# Patient Record
Sex: MALE | Race: ASIAN | Employment: UNEMPLOYED | URBAN - METROPOLITAN AREA
[De-identification: names, ages, dates, MRNs, and addresses within clinical notes are randomized per-mention and may not be internally consistent; named-entity substitution may affect disease eponyms.]

---

## 2018-09-18 ENCOUNTER — HOSPITAL ENCOUNTER (EMERGENCY)
Age: 43
Discharge: HOME | End: 2018-09-18
Attending: EMERGENCY MEDICINE | Admitting: EMERGENCY MEDICINE
Payer: MEDICARE

## 2018-09-18 ENCOUNTER — APPOINTMENT (OUTPATIENT)
Dept: CT IMAGING | Age: 43
End: 2018-09-18
Attending: FAMILY MEDICINE
Payer: MEDICARE

## 2018-09-18 VITALS
TEMPERATURE: 96.9 F | SYSTOLIC BLOOD PRESSURE: 127 MMHG | BODY MASS INDEX: 25.06 KG/M2 | OXYGEN SATURATION: 98 % | HEART RATE: 72 BPM | HEIGHT: 72 IN | DIASTOLIC BLOOD PRESSURE: 76 MMHG | RESPIRATION RATE: 14 BRPM | WEIGHT: 185 LBS

## 2018-09-18 DIAGNOSIS — E04.1 THYROID NODULE: ICD-10-CM

## 2018-09-18 DIAGNOSIS — H81.11 BPPV (BENIGN PAROXYSMAL POSITIONAL VERTIGO), RIGHT: Primary | ICD-10-CM

## 2018-09-18 LAB
ALBUMIN SERPL-MCNC: 3.5 G/DL (ref 3.5–5)
ALBUMIN/GLOB SERPL: 0.9 {RATIO} (ref 1.1–2.2)
ALP SERPL-CCNC: 76 U/L (ref 45–117)
ALT SERPL-CCNC: 16 U/L (ref 12–78)
ANION GAP SERPL CALC-SCNC: 6 MMOL/L (ref 5–15)
AST SERPL-CCNC: 10 U/L (ref 15–37)
ATRIAL RATE: 72 BPM
BASOPHILS # BLD: 0.1 K/UL (ref 0–0.1)
BASOPHILS NFR BLD: 1 % (ref 0–1)
BILIRUB SERPL-MCNC: 1.2 MG/DL (ref 0.2–1)
BUN SERPL-MCNC: 14 MG/DL (ref 6–20)
BUN/CREAT SERPL: 17 (ref 12–20)
CALCIUM SERPL-MCNC: 8.7 MG/DL (ref 8.5–10.1)
CALCULATED P AXIS, ECG09: 75 DEGREES
CALCULATED R AXIS, ECG10: 31 DEGREES
CALCULATED T AXIS, ECG11: 41 DEGREES
CHLORIDE SERPL-SCNC: 103 MMOL/L (ref 97–108)
CO2 SERPL-SCNC: 29 MMOL/L (ref 21–32)
COMMENT, HOLDF: NORMAL
CREAT SERPL-MCNC: 0.84 MG/DL (ref 0.7–1.3)
DIAGNOSIS, 93000: NORMAL
DIFFERENTIAL METHOD BLD: ABNORMAL
EOSINOPHIL # BLD: 0.1 K/UL (ref 0–0.4)
EOSINOPHIL NFR BLD: 1 % (ref 0–7)
ERYTHROCYTE [DISTWIDTH] IN BLOOD BY AUTOMATED COUNT: 18 % (ref 11.5–14.5)
GLOBULIN SER CALC-MCNC: 4 G/DL (ref 2–4)
GLUCOSE SERPL-MCNC: 278 MG/DL (ref 65–100)
HCT VFR BLD AUTO: 39.3 % (ref 36.6–50.3)
HGB BLD-MCNC: 11.9 G/DL (ref 12.1–17)
IMM GRANULOCYTES # BLD: 0 K/UL (ref 0–0.04)
IMM GRANULOCYTES NFR BLD AUTO: 0 % (ref 0–0.5)
LIPASE SERPL-CCNC: 110 U/L (ref 73–393)
LYMPHOCYTES # BLD: 2.7 K/UL (ref 0.8–3.5)
LYMPHOCYTES NFR BLD: 33 % (ref 12–49)
MCH RBC QN AUTO: 19.3 PG (ref 26–34)
MCHC RBC AUTO-ENTMCNC: 30.3 G/DL (ref 30–36.5)
MCV RBC AUTO: 63.6 FL (ref 80–99)
MONOCYTES # BLD: 0.5 K/UL (ref 0–1)
MONOCYTES NFR BLD: 6 % (ref 5–13)
NEUTS SEG # BLD: 4.8 K/UL (ref 1.8–8)
NEUTS SEG NFR BLD: 59 % (ref 32–75)
NRBC # BLD: 0 K/UL (ref 0–0.01)
NRBC BLD-RTO: 0 PER 100 WBC
P-R INTERVAL, ECG05: 148 MS
PLATELET # BLD AUTO: 362 K/UL (ref 150–400)
PMV BLD AUTO: 9.6 FL (ref 8.9–12.9)
POTASSIUM SERPL-SCNC: 3.7 MMOL/L (ref 3.5–5.1)
PROT SERPL-MCNC: 7.5 G/DL (ref 6.4–8.2)
Q-T INTERVAL, ECG07: 404 MS
QRS DURATION, ECG06: 88 MS
QTC CALCULATION (BEZET), ECG08: 442 MS
RBC # BLD AUTO: 6.18 M/UL (ref 4.1–5.7)
RBC MORPH BLD: ABNORMAL
SAMPLES BEING HELD,HOLD: NORMAL
SODIUM SERPL-SCNC: 138 MMOL/L (ref 136–145)
VENTRICULAR RATE, ECG03: 72 BPM
WBC # BLD AUTO: 8.2 K/UL (ref 4.1–11.1)

## 2018-09-18 PROCEDURE — 85025 COMPLETE CBC W/AUTO DIFF WBC: CPT | Performed by: EMERGENCY MEDICINE

## 2018-09-18 PROCEDURE — 74011250636 HC RX REV CODE- 250/636: Performed by: EMERGENCY MEDICINE

## 2018-09-18 PROCEDURE — 36415 COLL VENOUS BLD VENIPUNCTURE: CPT | Performed by: EMERGENCY MEDICINE

## 2018-09-18 PROCEDURE — 74011636320 HC RX REV CODE- 636/320: Performed by: RADIOLOGY

## 2018-09-18 PROCEDURE — 93005 ELECTROCARDIOGRAM TRACING: CPT

## 2018-09-18 PROCEDURE — 96361 HYDRATE IV INFUSION ADD-ON: CPT

## 2018-09-18 PROCEDURE — 99284 EMERGENCY DEPT VISIT MOD MDM: CPT

## 2018-09-18 PROCEDURE — 80053 COMPREHEN METABOLIC PANEL: CPT | Performed by: EMERGENCY MEDICINE

## 2018-09-18 PROCEDURE — 83690 ASSAY OF LIPASE: CPT | Performed by: EMERGENCY MEDICINE

## 2018-09-18 PROCEDURE — 70496 CT ANGIOGRAPHY HEAD: CPT

## 2018-09-18 PROCEDURE — 96374 THER/PROPH/DIAG INJ IV PUSH: CPT

## 2018-09-18 RX ORDER — MECLIZINE HYDROCHLORIDE 25 MG/1
25 TABLET ORAL
Qty: 30 TAB | Refills: 0 | Status: SHIPPED | OUTPATIENT
Start: 2018-09-18 | End: 2018-09-28

## 2018-09-18 RX ORDER — ONDANSETRON 2 MG/ML
4 INJECTION INTRAMUSCULAR; INTRAVENOUS
Status: COMPLETED | OUTPATIENT
Start: 2018-09-18 | End: 2018-09-18

## 2018-09-18 RX ORDER — MECLIZINE HYDROCHLORIDE 25 MG/1
25 TABLET ORAL
Status: COMPLETED | OUTPATIENT
Start: 2018-09-18 | End: 2018-09-18

## 2018-09-18 RX ORDER — ONDANSETRON 4 MG/1
4 TABLET, ORALLY DISINTEGRATING ORAL
Qty: 30 TAB | Refills: 0 | Status: SHIPPED | OUTPATIENT
Start: 2018-09-18 | End: 2021-11-11

## 2018-09-18 RX ADMIN — SODIUM CHLORIDE 1000 ML: 900 INJECTION, SOLUTION INTRAVENOUS at 12:26

## 2018-09-18 RX ADMIN — IOPAMIDOL 100 ML: 755 INJECTION, SOLUTION INTRAVENOUS at 13:59

## 2018-09-18 RX ADMIN — MECLIZINE HYDROCHLORIDE 25 MG: 25 TABLET ORAL at 12:25

## 2018-09-18 RX ADMIN — ONDANSETRON 4 MG: 2 INJECTION, SOLUTION INTRAMUSCULAR; INTRAVENOUS at 12:25

## 2018-09-18 NOTE — ED PROVIDER NOTES
HPI Comments: 11:25 AM 
I have evaluated the patient as the Provider in Triage. I have reviewed His vital signs and the triage nurse assessment. I have talked with the patient and any available family and advised that I am the provider in triage and have ordered the appropriate study to initiate their work up based on the clinical presentation during my assessment. I have advised that the patient will be accommodated in the Main ED as soon as possible. I have also requested to contact the triage nurse or myself immediately if the patient experiences any changes in their condition during this brief waiting period. Yuly Friedman MD 
 
 
Room-spinning sensation; sweaty; N, V; began today 0100 when he awoke to urinate; went back to sleep; sx's again this morning upon awakening. No hx same. Worse with position change. Yuly Friedman MD 
 
Woke this morning around 1:00 am with sensation of the world spinning, with head movements, particularly when turning head to the right side. On waking later in the morning developed similar symptoms. States that dizziness improves with closing eyes and keeping head still. Denies history of dizziness. Notes mild frontal headache, normal for patient, took tylenol in the morning, no headache currently. Noting nausea and vomiting food, Non-bilious and non-bloody. Deneis chest pain, SOB. Denies history of CVA or MI. Mamie Zhong MD  
 
The history is provided by the patient. No current facility-administered medications on file prior to encounter. Current Outpatient Prescriptions on File Prior to Encounter Medication Sig Dispense Refill  lisinopril (PRINIVIL, ZESTRIL) 5 mg tablet TAKE ONE TABLET BY MOUTH ONCE DAILY 90 Tab 0  
 glucose blood VI test strips (TRUETRACK TEST) strip Check blood sugars 4 times/day( fasting 2 hours after meals) 100 strip 11  
 glucose blood VI test strips (TRUETRACK TEST) strip Please check sugars fasting and 2 hours after meals. 1 Package 3  
 glipiZIDE (GLUCOTROL) 5 mg tablet Take 1 tablet by mouth two (2) times a day. 90 tablet 3  
 metFORMIN (GLUCOPHAGE) 1,000 mg tablet Take 1 tablet by mouth two (2) times daily (with meals). 180 tablet 1 Past Medical History:  
Diagnosis Date  Diabetes ([address] No past surgical history on file. Family History:  
Problem Relation Age of Onset  Diabetes Father  Hypertension Father  Heart Disease Father Social History Social History  Marital status:  Spouse name: N/A  
 Number of children: N/A  
 Years of education: N/A Occupational History  Not on file. Social History Main Topics  Smoking status: Current Every Day Smoker Packs/day: 0.50 Years: 14.00 Types: Cigarettes  Smokeless tobacco: Never Used  Alcohol use 0.0 oz/week  
  0 Glasses of wine, 0 Cans of beer per week Comment: Rarely.  Drug use: No  
 Sexual activity: Yes  
  Partners: Female Other Topics Concern  Not on file Social History Narrative  No narrative on file ALLERGIES: Review of patient's allergies indicates no known allergies. Review of Systems Constitutional: Negative for chills and fever. HENT: Negative for ear pain. Respiratory: Negative for chest tightness. Gastrointestinal: Positive for nausea. Negative for abdominal distention and abdominal pain. Neurological: Positive for dizziness. Negative for seizures, syncope, speech difficulty, weakness and headaches. Psychiatric/Behavioral: Negative for confusion. Vitals:  
 09/18/18 1124 BP: 113/72 Pulse: 72 Resp: 14 Temp: 96.9 °F (36.1 °C) SpO2: 98% Weight: 83.9 kg (185 lb) Height: 6' (1.829 m) Physical Exam  
Constitutional: He is oriented to person, place, and time. He appears well-developed and well-nourished. No distress. HENT:  
Head: Normocephalic and atraumatic. Neck: Normal range of motion. Neck supple. Cardiovascular: Normal rate, regular rhythm, normal heart sounds and intact distal pulses. Exam reveals no gallop and no friction rub. No murmur heard. Pulmonary/Chest: Effort normal and breath sounds normal.  
Abdominal: Soft. Bowel sounds are normal.  
Neurological: He is alert and oriented to person, place, and time. He has normal strength. No cranial nerve deficit or sensory deficit. He displays a negative Romberg sign. Coordination normal.  
Positive Newton Hamilton Hallpike on the right Nursing note and vitals reviewed. ED EKG interpretation:12:21 PM 
Rhythm: normal sinus rhythm; and regular . Rate (approx.): 72; Axis: normal; P wave: normal; QRS interval: normal ; ST/T wave: normal; Other findings: normal. This EKG was interpreted by Karina Echevarria MD,ED Provider. MDM Number of Diagnoses or Management Options BPPV (benign paroxysmal positional vertigo), right:  
Thyroid nodule:  
Diagnosis management comments: Ddx BPPV, Eustachian tube dysfunction. Positive Newton Hamilton-Hallpike, trial of Zofran and meclizine. 12:58 PM - Improvement with Zofran/Meclizine and NS bolus. 2:45 PM - Head CT Negative, incidental fiding of thyroid nodule. Patient advised to follow up with PCP/Endocrinology. Given Meclizine/Zofran and Epley maneuvers. Will need OP Thyroid US and advised as such Patient's results have been reviewed with them. Patient and/or family have verbally conveyed their understanding and agreement of the patient's signs, symptoms, diagnosis, treatment and prognosis and additionally agree to follow up as recommended or return to the Emergency Room should their condition change prior to follow-up. Discharge instructions have also been provided to the patient with some educational information regarding their diagnosis as well a list of reasons why they would want to return to the ER prior to their follow-up appointment should their condition change. Amount and/or Complexity of Data Reviewed Clinical lab tests: ordered and reviewed (Mild Anemia, BG elevated at 278) Tests in the radiology section of CPT®: ordered and reviewed (CT Head) Tests in the medicine section of CPT®: ordered and reviewed (EKG) Risk of Complications, Morbidity, and/or Mortality Presenting problems: moderate Diagnostic procedures: moderate Management options: low Patient Progress Patient progress: improved ED Course Procedures

## 2018-09-18 NOTE — DISCHARGE INSTRUCTIONS
We hope that we have addressed all of your medical concerns. The examination and treatment you received in the Emergency Department were for an emergent problem and were not intended as complete care. It is important that you follow up with your healthcare provider(s) for ongoing care. If your symptoms worsen or do not improve as expected, and you are unable to reach your usual health care provider(s), you should return to the Emergency Department. Today's healthcare is undergoing tremendous change, and patient satisfaction surveys are one of the many tools to assess the quality of medical care. You may receive a survey from the "[employer] regarding your experience in the Emergency Department. I hope that your experience has been completely positive, particularly the medical care that I provided. As such, please participate in the survey; anything less than excellent does not meet my expectations or intentions. Thank you for allowing us to provide you with medical care today. We realize that you have many choices for your emergency care needs. Please choose us in the future for any continued health care needs.       Recent Results (from the past 24 hour(s))   EKG, 12 LEAD, INITIAL    Collection Time: 09/18/18 11:35 AM   Result Value Ref Range    Ventricular Rate 72 BPM    Atrial Rate 72 BPM    P-R Interval 148 ms    QRS Duration 88 ms    Q-T Interval 404 ms    QTC Calculation (Bezet) 442 ms    Calculated P Axis 75 degrees    Calculated R Axis 31 degrees    Calculated T Axis 41 degrees    Diagnosis       Normal sinus rhythm  Normal ECG  No previous ECGs available     CBC WITH AUTOMATED DIFF    Collection Time: 09/18/18 11:43 AM   Result Value Ref Range    WBC 8.2 4.1 - 11.1 K/uL    RBC 6.18 (H) 4.10 - 5.70 M/uL    HGB 11.9 (L) 12.1 - 17.0 g/dL    HCT 39.3 36.6 - 50.3 %    MCV 63.6 (L) 80.0 - 99.0 FL    MCH 19.3 (L) 26.0 - 34.0 PG    MCHC 30.3 30.0 - 36.5 g/dL    RDW 18.0 (H) 11.5 - 14.5 %    PLATELET 035 180 - 362 K/uL    MPV 9.6 8.9 - 12.9 FL    NRBC 0.0 0  WBC    ABSOLUTE NRBC 0.00 0.00 - 0.01 K/uL    NEUTROPHILS 59 32 - 75 %    LYMPHOCYTES 33 12 - 49 %    MONOCYTES 6 5 - 13 %    EOSINOPHILS 1 0 - 7 %    BASOPHILS 1 0 - 1 %    IMMATURE GRANULOCYTES 0 0.0 - 0.5 %    ABS. NEUTROPHILS 4.8 1.8 - 8.0 K/UL    ABS. LYMPHOCYTES 2.7 0.8 - 3.5 K/UL    ABS. MONOCYTES 0.5 0.0 - 1.0 K/UL    ABS. EOSINOPHILS 0.1 0.0 - 0.4 K/UL    ABS. BASOPHILS 0.1 0.0 - 0.1 K/UL    ABS. IMM. GRANS. 0.0 0.00 - 0.04 K/UL    DF SMEAR SCANNED      RBC COMMENTS ANISOCYTOSIS  1+        RBC COMMENTS POIKILOCYTOSIS  1+        RBC COMMENTS TARGET CELLS  PRESENT        RBC COMMENTS OVALOCYTES  PRESENT       METABOLIC PANEL, COMPREHENSIVE    Collection Time: 09/18/18 11:43 AM   Result Value Ref Range    Sodium 138 136 - 145 mmol/L    Potassium 3.7 3.5 - 5.1 mmol/L    Chloride 103 97 - 108 mmol/L    CO2 29 21 - 32 mmol/L    Anion gap 6 5 - 15 mmol/L    Glucose 278 (H) 65 - 100 mg/dL    BUN 14 6 - 20 MG/DL    Creatinine 0.84 0.70 - 1.30 MG/DL    BUN/Creatinine ratio 17 12 - 20      GFR est AA >60 >60 ml/min/1.73m2    GFR est non-AA >60 >60 ml/min/1.73m2    Calcium 8.7 8.5 - 10.1 MG/DL    Bilirubin, total 1.2 (H) 0.2 - 1.0 MG/DL    ALT (SGPT) 16 12 - 78 U/L    AST (SGOT) 10 (L) 15 - 37 U/L    Alk. phosphatase 76 45 - 117 U/L    Protein, total 7.5 6.4 - 8.2 g/dL    Albumin 3.5 3.5 - 5.0 g/dL    Globulin 4.0 2.0 - 4.0 g/dL    A-G Ratio 0.9 (L) 1.1 - 2.2     LIPASE    Collection Time: 09/18/18 11:43 AM   Result Value Ref Range    Lipase 110 73 - 393 U/L   SAMPLES BEING HELD    Collection Time: 09/18/18 11:43 AM   Result Value Ref Range    SAMPLES BEING HELD RD,SST,VERONICA     COMMENT        Add-on orders for these samples will be processed based on acceptable specimen integrity and analyte stability, which may vary by analyte.        Cta Head Neck W Cont    Result Date: 9/18/2018  EXAM:  CTA HEAD NECK W CONT INDICATION:   Dizziness, nausea COMPARISON:  None. CONTRAST:  100 mL of Isovue-370. TECHNIQUE:  Unenhanced  images were obtained to localize the volume for acquisition. Multislice helical axial CT angiography was performed from the aortic arch to the top of the head during uneventful rapid bolus intravenous contrast administration. Coronal and sagittal reformations and 3D post processing was performed. CT dose reduction was achieved through use of a standardized protocol tailored for this examination and automatic exposure control for dose modulation. FINDINGS: CTA Head: There is no evidence of large vessel occlusion or flow-limiting stenosis of the intracranial internal carotid, anterior cerebral, and middle cerebral arteries. The anterior communicating artery is patent with small right A1 segment. There is no evidence of large vessel occlusion or flow-limiting stenosis of the intracranial vertebral arteries, basilar artery, or posterior cerebral arteries. The left posterior communicating arteries patent, the right is not well seen. There is no evidence of aneurysm or vascular malformation. The dural venous sinuses and deep cerebral venous system are patent. No evidence of abnormal parenchymal enhancement on delayed phase imaging. CTA NECK: NASCET method was utilized for calculating stenosis. The aortic arch is unremarkable. The common carotid arteries are normal in course and caliber bilaterally. There is no evidence of significant stenosis in the cervical right internal carotid artery. There is no evidence of significant stenosis in the cervical left internal carotid artery. There is a codominant vertebrobasilar arterial system. The vertebral arteries and imaged portion of the basilar artery are normal in course, size and contour without significant stenosis. An 11 mm heterogeneous right thyroid nodule is noted. Visualized lung apices are clear. No acute fracture or aggressive osseous lesion.  There is mucosal thickening in the left maxillary sinus without air-fluid level. There are periapical lucencies involving the left maxillary and mandibular molar teeth. IMPRESSION: CTA Head: 1. No evidence of significant stenosis or aneurysm. CTA Neck: 1. No evidence of significant stenosis. 2. An 11 mm right thyroid nodule. Consider outpatient thyroid ultrasound for further evaluation.

## 2021-09-09 ENCOUNTER — TELEPHONE (OUTPATIENT)
Dept: FAMILY MEDICINE CLINIC | Age: 46
End: 2021-09-09

## 2021-09-09 NOTE — TELEPHONE ENCOUNTER
Pt missed appointment scheduled for yesterday at 1:30 pm for new pt appointment, thinking it was today, is hearing impaired and was rescheduled for first available appointment of 11/11/21 at 11 am with 10:30 am arrival, but wants sooner appointment if available. Pt reads lips, but may need  and is agreeable to having our office provide this service.  Tova

## 2021-11-11 ENCOUNTER — OFFICE VISIT (OUTPATIENT)
Dept: FAMILY MEDICINE CLINIC | Age: 46
End: 2021-11-11
Payer: MEDICARE

## 2021-11-11 VITALS
SYSTOLIC BLOOD PRESSURE: 136 MMHG | HEIGHT: 72 IN | OXYGEN SATURATION: 100 % | DIASTOLIC BLOOD PRESSURE: 90 MMHG | WEIGHT: 186 LBS | BODY MASS INDEX: 25.19 KG/M2 | TEMPERATURE: 98.1 F | HEART RATE: 100 BPM

## 2021-11-11 DIAGNOSIS — N52.9 ERECTILE DYSFUNCTION, UNSPECIFIED ERECTILE DYSFUNCTION TYPE: ICD-10-CM

## 2021-11-11 DIAGNOSIS — Z12.5 SCREENING FOR PROSTATE CANCER: ICD-10-CM

## 2021-11-11 DIAGNOSIS — Z12.11 SCREENING FOR COLON CANCER: ICD-10-CM

## 2021-11-11 DIAGNOSIS — Z79.4 TYPE 2 DIABETES MELLITUS WITH HYPERGLYCEMIA, WITH LONG-TERM CURRENT USE OF INSULIN (HCC): Primary | ICD-10-CM

## 2021-11-11 DIAGNOSIS — Z11.59 NEED FOR HEPATITIS C SCREENING TEST: ICD-10-CM

## 2021-11-11 DIAGNOSIS — E11.65 TYPE 2 DIABETES MELLITUS WITH HYPERGLYCEMIA, WITH LONG-TERM CURRENT USE OF INSULIN (HCC): Primary | ICD-10-CM

## 2021-11-11 PROCEDURE — 99204 OFFICE O/P NEW MOD 45 MIN: CPT | Performed by: FAMILY MEDICINE

## 2021-11-11 PROCEDURE — 3046F HEMOGLOBIN A1C LEVEL >9.0%: CPT | Performed by: FAMILY MEDICINE

## 2021-11-11 PROCEDURE — G8427 DOCREV CUR MEDS BY ELIG CLIN: HCPCS | Performed by: FAMILY MEDICINE

## 2021-11-11 PROCEDURE — G8419 CALC BMI OUT NRM PARAM NOF/U: HCPCS | Performed by: FAMILY MEDICINE

## 2021-11-11 PROCEDURE — G8510 SCR DEP NEG, NO PLAN REQD: HCPCS | Performed by: FAMILY MEDICINE

## 2021-11-11 PROCEDURE — 2022F DILAT RTA XM EVC RTNOPTHY: CPT | Performed by: FAMILY MEDICINE

## 2021-11-11 RX ORDER — PEN NEEDLE, DIABETIC 30 GX3/16"
NEEDLE, DISPOSABLE MISCELLANEOUS
Qty: 1 EACH | Refills: 11 | Status: SHIPPED | OUTPATIENT
Start: 2021-11-11 | End: 2022-09-02 | Stop reason: SDUPTHER

## 2021-11-11 RX ORDER — INSULIN GLARGINE 100 [IU]/ML
20 INJECTION, SOLUTION SUBCUTANEOUS
Qty: 4 EACH | Refills: 3 | Status: SHIPPED | OUTPATIENT
Start: 2021-11-11 | End: 2021-11-30 | Stop reason: SDUPTHER

## 2021-11-11 RX ORDER — INSULIN ASPART 100 [IU]/ML
20 INJECTION, SOLUTION INTRAVENOUS; SUBCUTANEOUS
COMMUNITY
End: 2021-11-11 | Stop reason: SDUPTHER

## 2021-11-11 RX ORDER — INSULIN ASPART 100 [IU]/ML
20 INJECTION, SOLUTION INTRAVENOUS; SUBCUTANEOUS
Qty: 4 PEN | Refills: 1 | Status: SHIPPED | OUTPATIENT
Start: 2021-11-11 | End: 2021-11-30 | Stop reason: SDUPTHER

## 2021-11-11 RX ORDER — INSULIN GLARGINE 100 [IU]/ML
20 INJECTION, SOLUTION SUBCUTANEOUS
COMMUNITY
End: 2021-11-11 | Stop reason: SDUPTHER

## 2021-11-11 NOTE — PROGRESS NOTES
Identified pt with two pt identifiers(name and ). Reviewed record in preparation for visit and have obtained necessary documentation. Chief Complaint   Patient presents with    New Patient    Diabetes    Numbness     right leg at night        Vitals:    21 1100   BP: (!) 136/90   Pulse: 100   Temp: 98.1 °F (36.7 °C)   TempSrc: Temporal   SpO2: 100%   Weight: 186 lb (84.4 kg)   Height: 6' (1.829 m)   PainSc:   0 - No pain       Health Maintenance Due   Topic    Hepatitis C Screening     Pneumococcal 0-64 years (1 of 2 - PPSV23)    Foot Exam Q1     Eye Exam Retinal or Dilated     COVID-19 Vaccine (1)    DTaP/Tdap/Td series (1 - Tdap)    A1C test (Diabetic or Prediabetic)     MICROALBUMIN Q1     Lipid Screen     Medicare Yearly Exam     Colorectal Cancer Screening Combo     Flu Vaccine (1)       Coordination of Care Questionnaire:  :   1) Have you been to an emergency room, urgent care, or hospitalized since your last visit? If yes, where when, and reason for visit? No      2. Have seen or consulted any other health care provider since your last visit? If yes, where when, and reason for visit? No      Patient is accompanied by  Jose Middleton  I have received verbal consent from Lisa Pineda to discuss any/all medical information while they are present in the room.

## 2021-11-11 NOTE — PATIENT INSTRUCTIONS
Plan:   Go to ANY LAB [facility] YOU PREFER  Please go for fasting labs 8-12 hours of only water, black coffee or black tea  Please continue with insulin as we have ordered today  Will want to restart statin, ace after lab reports are returned  Short interval follow up  Recommend healthy habits, daily exercise    Pt reports utd on covid shot  Routine screenings per orders

## 2021-11-12 ENCOUNTER — TELEPHONE (OUTPATIENT)
Dept: FAMILY MEDICINE CLINIC | Age: 46
End: 2021-11-12

## 2021-11-12 NOTE — TELEPHONE ENCOUNTER
Called and spoke with pharmacist and she advises pt picked up Lantus on yesterday, 11/11/2021. Per pharmacy Novolog is no formulary.    PA for Novolog Submitted:  PA Case: [id number], Status: Approved, Coverage Starts on: 8/13/2021 12:00:00 AM, Coverage Ends on: 11/12/2022 12:00:00 AM.

## 2021-11-12 NOTE — TELEPHONE ENCOUNTER
Pt called to advise he was able to get his flu shot at [address] without prescription and his other prescriptions required prior authorization and may also need to clarify if both insulin prescriptions were intended. Please advise.  Tova

## 2021-11-14 LAB
ALBUMIN SERPL-MCNC: 4.4 G/DL (ref 4–5)
ALBUMIN/CREAT UR: 11 MG/G CREAT (ref 0–29)
ALBUMIN/GLOB SERPL: 1.4 {RATIO} (ref 1.2–2.2)
ALP SERPL-CCNC: 63 IU/L (ref 44–121)
ALT SERPL-CCNC: 22 IU/L (ref 0–44)
AST SERPL-CCNC: 16 IU/L (ref 0–40)
BILIRUB SERPL-MCNC: 1.3 MG/DL (ref 0–1.2)
BUN SERPL-MCNC: 11 MG/DL (ref 6–24)
BUN/CREAT SERPL: 11 (ref 9–20)
CALCIUM SERPL-MCNC: 9.3 MG/DL (ref 8.7–10.2)
CHLORIDE SERPL-SCNC: 104 MMOL/L (ref 96–106)
CHOLEST SERPL-MCNC: 183 MG/DL (ref 100–199)
CO2 SERPL-SCNC: 23 MMOL/L (ref 20–29)
CREAT SERPL-MCNC: 0.97 MG/DL (ref 0.76–1.27)
CREAT UR-MCNC: 206.9 MG/DL
ERYTHROCYTE [DISTWIDTH] IN BLOOD BY AUTOMATED COUNT: 19.1 % (ref 11.6–15.4)
EST. AVERAGE GLUCOSE BLD GHB EST-MCNC: 174 MG/DL
GLOBULIN SER CALC-MCNC: 3.1 G/DL (ref 1.5–4.5)
GLUCOSE SERPL-MCNC: 158 MG/DL (ref 65–99)
HBA1C MFR BLD: 7.7 % (ref 4.8–5.6)
HCT VFR BLD AUTO: 38.3 % (ref 37.5–51)
HCV AB S/CO SERPL IA: <0.1 S/CO RATIO (ref 0–0.9)
HDLC SERPL-MCNC: 41 MG/DL
HGB BLD-MCNC: 11.5 G/DL (ref 13–17.7)
IMP & REVIEW OF LAB RESULTS: NORMAL
LDLC SERPL CALC-MCNC: 115 MG/DL (ref 0–99)
MCH RBC QN AUTO: 19.6 PG (ref 26.6–33)
MCHC RBC AUTO-ENTMCNC: 30 G/DL (ref 31.5–35.7)
MCV RBC AUTO: 65 FL (ref 79–97)
MICROALBUMIN UR-MCNC: 23 UG/ML
PLATELET # BLD AUTO: 496 X10E3/UL (ref 150–450)
POTASSIUM SERPL-SCNC: 4 MMOL/L (ref 3.5–5.2)
PROT SERPL-MCNC: 7.5 G/DL (ref 6–8.5)
PSA SERPL-MCNC: 0.9 NG/ML (ref 0–4)
RBC # BLD AUTO: 5.88 X10E6/UL (ref 4.14–5.8)
SODIUM SERPL-SCNC: 140 MMOL/L (ref 134–144)
TRIGL SERPL-MCNC: 152 MG/DL (ref 0–149)
VLDLC SERPL CALC-MCNC: 27 MG/DL (ref 5–40)
WBC # BLD AUTO: 6.6 X10E3/UL (ref 3.4–10.8)

## 2021-11-15 NOTE — TELEPHONE ENCOUNTER
Called pt, and left a voice message, asking that he call the office back in regards to his medication when able.

## 2021-11-15 NOTE — TELEPHONE ENCOUNTER
CD    Yes, see my note as to how, the Lantus is long-acting and is to be taken at bedtime, and the aspart he is supposed to take with dinnertime, he had already started his insulin regimen before he got to me, he was taking his wife's insulin and I told him we needed to do lab work and continue this. [facility] is deaf    Message text      Kristin Cramer MD 3 days ago     SW    Is patient to take both insulins?

## 2021-11-30 ENCOUNTER — OFFICE VISIT (OUTPATIENT)
Dept: FAMILY MEDICINE CLINIC | Age: 46
End: 2021-11-30
Payer: MEDICARE

## 2021-11-30 VITALS
DIASTOLIC BLOOD PRESSURE: 87 MMHG | WEIGHT: 188 LBS | HEIGHT: 72 IN | TEMPERATURE: 97.9 F | SYSTOLIC BLOOD PRESSURE: 133 MMHG | HEART RATE: 88 BPM | BODY MASS INDEX: 25.47 KG/M2 | OXYGEN SATURATION: 98 %

## 2021-11-30 DIAGNOSIS — Z79.4 TYPE 2 DIABETES MELLITUS WITH HYPERGLYCEMIA, WITH LONG-TERM CURRENT USE OF INSULIN (HCC): ICD-10-CM

## 2021-11-30 DIAGNOSIS — D50.8 OTHER IRON DEFICIENCY ANEMIA: Primary | ICD-10-CM

## 2021-11-30 DIAGNOSIS — E11.65 TYPE 2 DIABETES MELLITUS WITH HYPERGLYCEMIA, WITH LONG-TERM CURRENT USE OF INSULIN (HCC): ICD-10-CM

## 2021-11-30 DIAGNOSIS — K59.00 CONSTIPATION, UNSPECIFIED CONSTIPATION TYPE: ICD-10-CM

## 2021-11-30 DIAGNOSIS — E78.2 MIXED HYPERLIPIDEMIA: ICD-10-CM

## 2021-11-30 PROCEDURE — 3051F HG A1C>EQUAL 7.0%<8.0%: CPT | Performed by: FAMILY MEDICINE

## 2021-11-30 PROCEDURE — G8419 CALC BMI OUT NRM PARAM NOF/U: HCPCS | Performed by: FAMILY MEDICINE

## 2021-11-30 PROCEDURE — 99214 OFFICE O/P EST MOD 30 MIN: CPT | Performed by: FAMILY MEDICINE

## 2021-11-30 PROCEDURE — 2022F DILAT RTA XM EVC RTNOPTHY: CPT | Performed by: FAMILY MEDICINE

## 2021-11-30 PROCEDURE — G8427 DOCREV CUR MEDS BY ELIG CLIN: HCPCS | Performed by: FAMILY MEDICINE

## 2021-11-30 PROCEDURE — G8510 SCR DEP NEG, NO PLAN REQD: HCPCS | Performed by: FAMILY MEDICINE

## 2021-11-30 RX ORDER — INSULIN GLARGINE 100 [IU]/ML
20 INJECTION, SOLUTION SUBCUTANEOUS
Qty: 4 EACH | Refills: 5 | Status: SHIPPED | OUTPATIENT
Start: 2021-11-30 | End: 2022-09-02 | Stop reason: SDUPTHER

## 2021-11-30 RX ORDER — ATORVASTATIN CALCIUM 20 MG/1
20 TABLET, FILM COATED ORAL DAILY
Qty: 90 TABLET | Refills: 3 | Status: SHIPPED | OUTPATIENT
Start: 2021-11-30 | End: 2022-09-02 | Stop reason: SDUPTHER

## 2021-11-30 RX ORDER — POLYETHYLENE GLYCOL 3350 17 G/17G
17 POWDER, FOR SOLUTION ORAL DAILY
COMMUNITY

## 2021-11-30 RX ORDER — INSULIN ASPART 100 [IU]/ML
20 INJECTION, SOLUTION INTRAVENOUS; SUBCUTANEOUS
Qty: 4 PEN | Refills: 5 | Status: SHIPPED | OUTPATIENT
Start: 2021-11-30 | End: 2022-09-02 | Stop reason: SDUPTHER

## 2021-11-30 NOTE — PROGRESS NOTES
Identified pt with two pt identifiers(name and ). Reviewed record in preparation for visit and have obtained necessary documentation. Chief Complaint   Patient presents with    Follow-up    Diabetes        Vitals:    21 1143   BP: 133/87   Pulse: 88   Temp: 97.9 °F (36.6 °C)   TempSrc: Temporal   SpO2: 98%   Weight: 188 lb (85.3 kg)   Height: 6' (1.829 m)   PainSc:   2   PainLoc: Leg       Health Maintenance Due   Topic    COVID-19 Vaccine (1)    Pneumococcal 0-64 years (1 of 2 - PPSV23)    Foot Exam Q1     Eye Exam Retinal or Dilated     DTaP/Tdap/Td series (1 - Tdap)    Medicare Yearly Exam     Colorectal Cancer Screening Combo     Flu Vaccine (1)       Coordination of Care Questionnaire:  :   1) Have you been to an emergency room, urgent care, or hospitalized since your last visit? If yes, where when, and reason for visit? No    2. Have seen or consulted any other health care provider since your last visit? If yes, where when, and reason for visit? No      Patient is accompanied by Diamond I have received verbal consent from Max Vo to discuss any/all medical information while they are present in the room.

## 2021-11-30 NOTE — PATIENT INSTRUCTIONS
Lab recheck 6 m  Start iron supplement  Treat constipation (might get worse with iron supp, watch for this)  Add lipitor  Consider lisinopril  C/w insulin, monitor diet, watch for hypoglycemia    Call with concerns

## 2021-11-30 NOTE — PROGRESS NOTES
Family Medicine Follow-Up Progress Note  Patient: Shawna Barrett  1975, 48 y.o., male  Encounter Date: 11/30/2021    ASSESSMENT & PLAN    ICD-10-CM ICD-9-CM    1. Other iron deficiency anemia  D50.8 280.8 ferrous sulfate (SLOW FE) 142 mg (45 mg iron) ER tablet      IRON PROFILE      FERRITIN      CBC W/O DIFF      IRON PROFILE      FERRITIN      CBC W/O DIFF   2. Constipation, unspecified constipation type  K59.00 564.00 polyethylene glycol (Miralax) 17 gram/dose powder   3. Mixed hyperlipidemia  E78.2 272.2 atorvastatin (LIPITOR) 20 mg tablet      METABOLIC PANEL, COMPREHENSIVE      LIPID PANEL      METABOLIC PANEL, COMPREHENSIVE      LIPID PANEL   4. Type 2 diabetes mellitus with hyperglycemia, with long-term current use of insulin (HCC)  E11.65 250.00 atorvastatin (LIPITOR) 20 mg tablet    Z79.4 790.29 HEMOGLOBIN A1C WITH EAG     V58.67 CBC W/O DIFF      HEMOGLOBIN A1C WITH EAG      CBC W/O DIFF      insulin aspart U-100 (NOVOLOG) 100 unit/mL (3 mL) inpn      insulin glargine (LANTUS,BASAGLAR) 100 unit/mL (3 mL) inpn       Orders Placed This Encounter    HEMOGLOBIN A1C WITH EAG     Standing Status:   Future     Number of Occurrences:   1     Standing Expiration Date:   99/74/6707    METABOLIC PANEL, COMPREHENSIVE     Standing Status:   Future     Number of Occurrences:   1     Standing Expiration Date:   11/30/2022    LIPID PANEL     Standing Status:   Future     Number of Occurrences:   1     Standing Expiration Date:   11/30/2022    IRON PROFILE     Standing Status:   Future     Number of Occurrences:   1     Standing Expiration Date:   11/30/2022    FERRITIN     Standing Status:   Future     Number of Occurrences:   1     Standing Expiration Date:   11/30/2022    CBC W/O DIFF     Standing Status:   Future     Number of Occurrences:   1     Standing Expiration Date:   11/30/2022    ferrous sulfate (SLOW FE) 142 mg (45 mg iron) ER tablet     Sig: Take 1 Tablet by mouth Daily (before breakfast). Dispense:  90 Tablet     Refill:  1    polyethylene glycol (Miralax) 17 gram/dose powder     Sig: Take 17 g by mouth daily. DISSOLVE IN LIQUID    atorvastatin (LIPITOR) 20 mg tablet     Sig: Take 1 Tablet by mouth daily. Dispense:  90 Tablet     Refill:  3    insulin aspart U-100 (NOVOLOG) 100 unit/mL (3 mL) inpn     Si Units by SubCUTAneous route daily (with dinner). Dispense:  4 Pen     Refill:  5    insulin glargine (LANTUS,BASAGLAR) 100 unit/mL (3 mL) inpn     Si Units by SubCUTAneous route nightly. Dispense:  4 Each     Refill:  5       Patient Instructions   Lab recheck 6 m  Start iron supplement  Treat constipation (might get worse with iron supp, watch for this)  Add lipitor  Consider lisinopril  C/w insulin, monitor diet, watch for hypoglycemia    Call with concerns      CHIEF COMPLAINT  Chief Complaint   Patient presents with    Follow-up    Diabetes       SUBJECTIVE  Risa Hernandez is a 48 y.o. male presenting today for     Some nights having sharp knife like pains over body    Diabetic follow up  Accompanied by  today  Diabetes    Sugars controlled moderately  Hypoglycemia: mild--one episode down to 82, felt symtpoms, drank sweet tea and 2 pieces of chocolate  Tolerating current treatment well  Current medications include   Key Antihyperglycemic Medications             insulin aspart U-100 (NOVOLOG) 100 unit/mL (3 mL) inpn (Taking) 20 Units by SubCUTAneous route daily (with dinner). insulin glargine (LANTUS,BASAGLAR) 100 unit/mL (3 mL) inpn (Taking) 20 Units by SubCUTAneous route nightly.           Lab Results   Component Value Date/Time    Hemoglobin A1c 7.7 (H) 2021 11:20 AM    Glucose 158 (H) 2021 11:20 AM    Glucose  2014 10:03 AM    Microalb/Creat ratio (ug/mg creat.) 11 2021 11:20 AM    LDL, calculated 115 (H) 2021 11:20 AM    LDL, calculated 100 (H) 2014 10:49 AM    Creatinine 0.97 2021 11:20 AM     Lab Results   Component Value Date/Time    Microalb/Creat ratio (ug/mg creat.) 11 11/12/2021 11:20 AM       Last eye exam performed  greather than 1 year ago and was scheduled to go back he tells me     Labs reviewed with patient  Microcytosis--anemia--no hx of thalassemia in family that he knows of    Chronic constipation (might get worse with iron supplementation    Mixed HLD, he is not presently on a statin          ROS  Review of Systems  A 12 point review of systems was negative except as noted here or in the HPI. OBJECTIVE  Visit Vitals  /87 (BP 1 Location: Left upper arm, BP Patient Position: Sitting, BP Cuff Size: Large adult)   Pulse 88   Temp 97.9 °F (36.6 °C) (Temporal)   Ht 6' (1.829 m)   Wt 188 lb (85.3 kg)   SpO2 98%   BMI 25.50 kg/m²       Physical Exam  Vitals reviewed. Constitutional:       General: He is not in acute distress. Appearance: Normal appearance. He is normal weight. He is not ill-appearing, toxic-appearing or diaphoretic. HENT:      Head: Normocephalic and atraumatic. Ears:      Comments: l ear hearing aid in place  Eyes:      General: No scleral icterus. Cardiovascular:      Rate and Rhythm: Normal rate and regular rhythm. Pulmonary:      Effort: Pulmonary effort is normal. No respiratory distress. Musculoskeletal:      Right lower leg: No edema. Left lower leg: No edema. Skin:     Coloration: Skin is not jaundiced or pale. Findings: No bruising, erythema, lesion or rash. Neurological:      General: No focal deficit present. Mental Status: He is alert. Cranial Nerves: No cranial nerve deficit. Gait: Gait normal.   Psychiatric:         Mood and Affect: Mood normal.         Behavior: Behavior normal.         Thought Content: Thought content normal.         Judgment: Judgment normal.         No results found for any visits on 11/30/21.     HISTORICAL  Reviewed and updated today, and as noted below:    Past Medical History:   Diagnosis Date    Diabetes [facility])      History reviewed. No pertinent surgical history. Family History   Problem Relation Age of Onset    Diabetes Father     Hypertension Father     Heart Disease Father      Social History     Tobacco Use   Smoking Status Current Every Day Smoker    Packs/day: 0.50    Years: 14.00    Pack years: 7.00    Types: Cigarettes   Smokeless Tobacco Never Used     Social History     Socioeconomic History    Marital status:    Tobacco Use    Smoking status: Current Every Day Smoker     Packs/day: 0.50     Years: 14.00     Pack years: 7.00     Types: Cigarettes    Smokeless tobacco: Never Used   Substance and Sexual Activity    Alcohol use: Yes     Alcohol/week: 0.0 standard drinks     Comment: Rarely.  Drug use: No    Sexual activity: Yes     Partners: Female     No Known Allergies    LAB REVIEW  Lab Results   Component Value Date/Time    Sodium 140 11/12/2021 11:20 AM    Potassium 4.0 11/12/2021 11:20 AM    Chloride 104 11/12/2021 11:20 AM    CO2 23 11/12/2021 11:20 AM    Anion gap 6 09/18/2018 11:43 AM    Glucose 158 (H) 11/12/2021 11:20 AM    BUN 11 11/12/2021 11:20 AM    Creatinine 0.97 11/12/2021 11:20 AM    BUN/Creatinine ratio 11 11/12/2021 11:20 AM    GFR est  11/12/2021 11:20 AM    GFR est non-AA 92 11/12/2021 11:20 AM    Calcium 9.3 11/12/2021 11:20 AM    Bilirubin, total 1.3 (H) 11/12/2021 11:20 AM    Alk.  phosphatase 63 11/12/2021 11:20 AM    Protein, total 7.5 11/12/2021 11:20 AM    Albumin 4.4 11/12/2021 11:20 AM    Globulin 4.0 09/18/2018 11:43 AM    A-G Ratio 1.4 11/12/2021 11:20 AM    ALT (SGPT) 22 11/12/2021 11:20 AM     Lab Results   Component Value Date/Time    WBC 6.6 11/12/2021 11:20 AM    HGB 11.5 (L) 11/12/2021 11:20 AM    HCT 38.3 11/12/2021 11:20 AM    PLATELET 299 (H) 34/49/6267 11:20 AM    MCV 65 (L) 11/12/2021 11:20 AM     Lab Results   Component Value Date/Time    Hemoglobin A1c 7.7 (H) 11/12/2021 11:20 AM    Hemoglobin A1c (POC) 8.8 11/11/2014 09:50 AM     Lab Results   Component Value Date/Time    Cholesterol, total 183 11/12/2021 11:20 AM    HDL Cholesterol 41 11/12/2021 11:20 AM    LDL, calculated 115 (H) 11/12/2021 11:20 AM    LDL, calculated 100 (H) 11/11/2014 10:49 AM    VLDL, calculated 27 11/12/2021 11:20 AM    VLDL, calculated 28 11/11/2014 10:49 AM    Triglyceride 152 (H) 11/12/2021 11:20 AM           Candice Bowman MD  [facility]  11/30/21 12:23 PM    Portions of this note may have been populated using smart dictation software and may have \"sounds-like\" errors present. Pt was counseled on risks, benefits and alternatives of treatment options. All questions were asked and answered and the patient was agreeable with the treatment plan as outlined.

## 2022-04-30 LAB
ALBUMIN SERPL-MCNC: 4.4 G/DL (ref 4–5)
ALBUMIN/GLOB SERPL: 1.4 {RATIO} (ref 1.2–2.2)
ALP SERPL-CCNC: 76 IU/L (ref 44–121)
ALT SERPL-CCNC: 31 IU/L (ref 0–44)
AST SERPL-CCNC: 16 IU/L (ref 0–40)
BILIRUB SERPL-MCNC: 1.3 MG/DL (ref 0–1.2)
BUN SERPL-MCNC: 12 MG/DL (ref 6–24)
BUN/CREAT SERPL: 16 (ref 9–20)
CALCIUM SERPL-MCNC: 9.1 MG/DL (ref 8.7–10.2)
CHLORIDE SERPL-SCNC: 101 MMOL/L (ref 96–106)
CHOLEST SERPL-MCNC: 128 MG/DL (ref 100–199)
CO2 SERPL-SCNC: 22 MMOL/L (ref 20–29)
CREAT SERPL-MCNC: 0.76 MG/DL (ref 0.76–1.27)
EGFR: 111 ML/MIN/1.73
ERYTHROCYTE [DISTWIDTH] IN BLOOD BY AUTOMATED COUNT: 18.6 % (ref 11.6–15.4)
EST. AVERAGE GLUCOSE BLD GHB EST-MCNC: 217 MG/DL
FERRITIN SERPL-MCNC: 349 NG/ML (ref 30–400)
GLOBULIN SER CALC-MCNC: 3.2 G/DL (ref 1.5–4.5)
GLUCOSE SERPL-MCNC: 235 MG/DL (ref 65–99)
HBA1C MFR BLD: 9.2 % (ref 4.8–5.6)
HCT VFR BLD AUTO: 37.6 % (ref 37.5–51)
HDLC SERPL-MCNC: 40 MG/DL
HGB BLD-MCNC: 11.1 G/DL (ref 13–17.7)
IMP & REVIEW OF LAB RESULTS: NORMAL
IRON SATN MFR SERPL: 39 % (ref 15–55)
IRON SERPL-MCNC: 114 UG/DL (ref 38–169)
LDLC SERPL CALC-MCNC: 70 MG/DL (ref 0–99)
MCH RBC QN AUTO: 19.6 PG (ref 26.6–33)
MCHC RBC AUTO-ENTMCNC: 29.5 G/DL (ref 31.5–35.7)
MCV RBC AUTO: 66 FL (ref 79–97)
PLATELET # BLD AUTO: 419 X10E3/UL (ref 150–450)
POTASSIUM SERPL-SCNC: 4.1 MMOL/L (ref 3.5–5.2)
PROT SERPL-MCNC: 7.6 G/DL (ref 6–8.5)
RBC # BLD AUTO: 5.66 X10E6/UL (ref 4.14–5.8)
SODIUM SERPL-SCNC: 137 MMOL/L (ref 134–144)
TIBC SERPL-MCNC: 296 UG/DL (ref 250–450)
TRIGL SERPL-MCNC: 94 MG/DL (ref 0–149)
UIBC SERPL-MCNC: 182 UG/DL (ref 111–343)
VLDLC SERPL CALC-MCNC: 18 MG/DL (ref 5–40)
WBC # BLD AUTO: 7.8 X10E3/UL (ref 3.4–10.8)

## 2022-06-02 ENCOUNTER — TELEPHONE (OUTPATIENT)
Dept: FAMILY MEDICINE CLINIC | Age: 47
End: 2022-06-02

## 2022-06-02 ENCOUNTER — OFFICE VISIT (OUTPATIENT)
Dept: FAMILY MEDICINE CLINIC | Age: 47
End: 2022-06-02
Payer: MEDICARE

## 2022-06-02 VITALS
WEIGHT: 183 LBS | RESPIRATION RATE: 16 BRPM | TEMPERATURE: 97.7 F | SYSTOLIC BLOOD PRESSURE: 104 MMHG | HEART RATE: 93 BPM | DIASTOLIC BLOOD PRESSURE: 79 MMHG | HEIGHT: 72 IN | BODY MASS INDEX: 24.79 KG/M2 | OXYGEN SATURATION: 98 %

## 2022-06-02 DIAGNOSIS — Z79.4 TYPE 2 DIABETES MELLITUS WITH HYPERGLYCEMIA, WITH LONG-TERM CURRENT USE OF INSULIN (HCC): Primary | ICD-10-CM

## 2022-06-02 DIAGNOSIS — E11.65 TYPE 2 DIABETES MELLITUS WITH HYPERGLYCEMIA, WITH LONG-TERM CURRENT USE OF INSULIN (HCC): Primary | ICD-10-CM

## 2022-06-02 DIAGNOSIS — E78.2 MIXED HYPERLIPIDEMIA: ICD-10-CM

## 2022-06-02 PROCEDURE — 99214 OFFICE O/P EST MOD 30 MIN: CPT | Performed by: FAMILY MEDICINE

## 2022-06-02 PROCEDURE — G8510 SCR DEP NEG, NO PLAN REQD: HCPCS | Performed by: FAMILY MEDICINE

## 2022-06-02 PROCEDURE — G8420 CALC BMI NORM PARAMETERS: HCPCS | Performed by: FAMILY MEDICINE

## 2022-06-02 PROCEDURE — G8427 DOCREV CUR MEDS BY ELIG CLIN: HCPCS | Performed by: FAMILY MEDICINE

## 2022-06-02 PROCEDURE — 3046F HEMOGLOBIN A1C LEVEL >9.0%: CPT | Performed by: FAMILY MEDICINE

## 2022-06-02 PROCEDURE — 2022F DILAT RTA XM EVC RTNOPTHY: CPT | Performed by: FAMILY MEDICINE

## 2022-06-02 RX ORDER — FLASH GLUCOSE SCANNING READER
EACH MISCELLANEOUS
Qty: 1 EACH | Refills: 1 | Status: SHIPPED | OUTPATIENT
Start: 2022-06-02

## 2022-06-02 RX ORDER — FLASH GLUCOSE SENSOR
KIT MISCELLANEOUS
Qty: 6 KIT | Refills: 1 | Status: SHIPPED | OUTPATIENT
Start: 2022-06-02

## 2022-06-02 NOTE — PROGRESS NOTES
Family Medicine Follow-Up Progress Note  Patient: Hiwot Correia  1975, 48 y.o., male  Encounter Date: 6/2/2022    ASSESSMENT & PLAN    ICD-10-CM ICD-9-CM    1. Type 2 diabetes mellitus with hyperglycemia, with long-term current use of insulin ([facility])  E11.65 250.00 empagliflozin (Jardiance) 10 mg tablet    Z79.4 790.29 REFERRAL TO PHARMACIST     V58.67 flash glucose scanning reader (FreeStyle Nerissa 2 [place]) misc      flash glucose sensor (FreeStyle Nerissa 2 Sensor) kit   2. Mixed hyperlipidemia  E78.2 272.2        Orders Placed This Encounter    REFERRAL TO PHARMACIST     Referral Priority:   Routine     Referral Type:   Consultation     Referral Reason:   Specialty Services Required     Referred to Provider:   Marlaine Mortimer, [facility]     Requested Specialty:   Pharmacist     Number of Visits Requested:   1    empagliflozin (Jardiance) 10 mg tablet     Sig: Take 1 Tablet by mouth daily. Dispense:  90 Tablet     Refill:  1    flash glucose scanning reader (FreeStyle Nerissa 2 [place]) misc     Sig: Use per package instructions e11.65     Dispense:  1 Each     Refill:  1    flash glucose sensor (FreeStyle Nerissa 2 Sensor) kit     Sig: Use per package instructions e11.65     Dispense:  6 Kit     Refill:  1       Patient Instructions   The long acting insulin you have is LANTUS--you take 20 U at bedtime  The short acting insulin you have for meal time is ASPART (take the blue injection within 15 minutes of starting or finishing your meal)    Your a1c has gone up, it is now 9.2 and you are not controlled    I recommend insulin injections be administered in the stomach    I recommend also that we add a new medication called Jardiance   Take this one pill 10 mg in the morning    Cholesterol looks much better, good job!       CHIEF COMPLAINT  Chief Complaint   Patient presents with    Diabetes     well controlled with insulin       SUBJECTIVE  Hiwot Correia is a 48 y.o. male presenting today for follow up  He says he can't smell anything and he's worried he might have covid  He says this is ongoing for about 2 weeks ago    He believes he got covid from the ICU  He says he had exposure    He's been taking vit c and tylenol  And he says not being able to smell things is disruptive    He asks about saline nasal spray    Diabetes    Sugars controlled poorly  Hypoglycemia: none  Tolerating current treatment well  Current medications include   Key Antihyperglycemic Medications             empagliflozin (Jardiance) 10 mg tablet (Taking) Take 1 Tablet by mouth daily. insulin aspart U-100 (NOVOLOG) 100 unit/mL (3 mL) inpn (Taking) 20 Units by SubCUTAneous route daily (with dinner). insulin glargine (LANTUS,BASAGLAR) 100 unit/mL (3 mL) inpn (Taking) 20 Units by SubCUTAneous route nightly.       taking insulin, tells me his numbers go down and then he has to eat sweets  He says he's not checking his sugar number so much as he is feeling it  When he feels low then he checks his number, he finds it goes as low as   Eating rice at 8pm and then sugar to 250 or 300, then at 9 or 10 he says his sugar goes to 120 or 110, if he doesn't eat it will go as low as 85  He only eats rice once at night after  Not testing daily, just when he feels sick, getting palpitations    Lab Results   Component Value Date/Time    Hemoglobin A1c 9.2 (H) 04/29/2022 12:00 AM    Hemoglobin A1c 7.7 (H) 11/12/2021 11:20 AM    Glucose 235 (H) 04/29/2022 12:00 AM    Glucose  11/11/2014 10:03 AM    Microalb/Creat ratio (ug/mg creat.) 11 11/12/2021 11:20 AM    LDL, calculated 70 04/29/2022 12:00 AM    LDL, calculated 100 (H) 11/11/2014 10:49 AM    Creatinine 0.76 04/29/2022 12:00 AM     Lab Results   Component Value Date/Time    Microalb/Creat ratio (ug/mg creat.) 11 11/12/2021 11:20 AM       Sounds like taking aspart delayed from meal time      ROS  Review of Systems  A 12 point review of systems was negative except as noted here or in the HPI.    OBJECTIVE  Visit Vitals  /79   Pulse 93   Temp 97.7 °F (36.5 °C) (Temporal)   Resp 16   Ht 6' (1.829 m)   Wt 183 lb (83 kg)   SpO2 98%   BMI 24.82 kg/m²       Physical Exam  Vitals reviewed. Constitutional:       General: He is not in acute distress. Appearance: Normal appearance. He is normal weight. He is not ill-appearing, toxic-appearing or diaphoretic. HENT:      Head: Normocephalic and atraumatic. Ears:      Comments: bl ear hearing aid in place  Eyes:      General: No scleral icterus. Cardiovascular:      Rate and Rhythm: Normal rate and regular rhythm. Pulmonary:      Effort: Pulmonary effort is normal. No respiratory distress. Musculoskeletal:      Right lower leg: No edema. Left lower leg: No edema. Skin:     Coloration: Skin is not jaundiced or pale. Findings: No bruising, erythema, lesion or rash. Neurological:      General: No focal deficit present. Mental Status: He is alert. Cranial Nerves: No cranial nerve deficit. Gait: Gait normal.   Psychiatric:         Mood and Affect: Mood normal.         Behavior: Behavior normal.         Thought Content: Thought content normal.         Judgment: Judgment normal.         No results found for any visits on 06/02/22. HISTORICAL  Reviewed and updated today, and as noted below:    Past Medical History:   Diagnosis Date    Diabetes ([facility] [address]      History reviewed. No pertinent surgical history.   Family History   Problem Relation Age of Onset    Diabetes Father     Hypertension Father     Heart Disease Father      Social History     Tobacco Use   Smoking Status Current Every Day Smoker    Packs/day: 0.50    Years: 14.00    Pack years: 7.00    Types: Cigarettes   Smokeless Tobacco Never Used     Social History     Socioeconomic History    Marital status:    Tobacco Use    Smoking status: Current Every Day Smoker     Packs/day: 0.50     Years: 14.00     Pack years: 7.00     Types: Cigarettes  Smokeless tobacco: Never Used   Substance and Sexual Activity    Alcohol use: Yes     Alcohol/week: 0.0 standard drinks     Comment: Rarely.  Drug use: No    Sexual activity: Yes     Partners: Female     No Known Allergies    LAB REVIEW  Lab Results   Component Value Date/Time    Sodium 137 04/29/2022 12:00 AM    Potassium 4.1 04/29/2022 12:00 AM    Chloride 101 04/29/2022 12:00 AM    CO2 22 04/29/2022 12:00 AM    Anion gap 6 09/18/2018 11:43 AM    Glucose 235 (H) 04/29/2022 12:00 AM    BUN 12 04/29/2022 12:00 AM    Creatinine 0.76 04/29/2022 12:00 AM    BUN/Creatinine ratio 16 04/29/2022 12:00 AM    GFR est  11/12/2021 11:20 AM    GFR est non-AA 92 11/12/2021 11:20 AM    Calcium 9.1 04/29/2022 12:00 AM    Bilirubin, total 1.3 (H) 04/29/2022 12:00 AM    Alk. phosphatase 76 04/29/2022 12:00 AM    Protein, total 7.6 04/29/2022 12:00 AM    Albumin 4.4 04/29/2022 12:00 AM    Globulin 4.0 09/18/2018 11:43 AM    A-G Ratio 1.4 04/29/2022 12:00 AM    ALT (SGPT) 31 04/29/2022 12:00 AM     Lab Results   Component Value Date/Time    WBC 7.8 04/29/2022 12:00 AM    HGB 11.1 (L) 04/29/2022 12:00 AM    HCT 37.6 04/29/2022 12:00 AM    PLATELET 120 95/27/8778 12:00 AM    MCV 66 (L) 04/29/2022 12:00 AM     Lab Results   Component Value Date/Time    Hemoglobin A1c 9.2 (H) 04/29/2022 12:00 AM    Hemoglobin A1c (POC) 8.8 11/11/2014 09:50 AM     Lab Results   Component Value Date/Time    Cholesterol, total 128 04/29/2022 12:00 AM    HDL Cholesterol 40 04/29/2022 12:00 AM    LDL, calculated 70 04/29/2022 12:00 AM    LDL, calculated 100 (H) 11/11/2014 10:49 AM    VLDL, calculated 18 04/29/2022 12:00 AM    VLDL, calculated 28 11/11/2014 10:49 AM    Triglyceride 94 04/29/2022 12:00 AM           Shelby Alcocer MD  [facility]  06/02/22 1:04 PM    Portions of this note may have been populated using smart dictation software and may have \"sounds-like\" errors present.      Pt was counseled on risks, benefits and alternatives of treatment options. All questions were asked and answered and the patient was agreeable with the treatment plan as outlined.

## 2022-06-02 NOTE — PATIENT INSTRUCTIONS
The long acting insulin you have is LANTUS--you take 20 U at bedtime  The short acting insulin you have for meal time is ASPART (take the blue injection within 15 minutes of starting or finishing your meal)    Your a1c has gone up, it is now 9.2 and you are not controlled    I recommend insulin injections be administered in the stomach    I recommend also that we add a new medication called Jardiance   Take this one pill 10 mg in the morning    Cholesterol looks much better, good job!

## 2022-06-06 ENCOUNTER — HOSPITAL ENCOUNTER (EMERGENCY)
Age: 47
Discharge: HOME | End: 2022-06-06
Attending: EMERGENCY MEDICINE
Payer: MEDICARE

## 2022-06-06 ENCOUNTER — APPOINTMENT (OUTPATIENT)
Dept: CT IMAGING | Age: 47
End: 2022-06-06
Attending: FAMILY MEDICINE
Payer: MEDICARE

## 2022-06-06 VITALS
HEART RATE: 95 BPM | SYSTOLIC BLOOD PRESSURE: 154 MMHG | OXYGEN SATURATION: 99 % | DIASTOLIC BLOOD PRESSURE: 93 MMHG | TEMPERATURE: 98.8 F | WEIGHT: 185 LBS | BODY MASS INDEX: 27.4 KG/M2 | HEIGHT: 69 IN | RESPIRATION RATE: 12 BRPM

## 2022-06-06 DIAGNOSIS — S01.81XA FACIAL LACERATION, INITIAL ENCOUNTER: Primary | ICD-10-CM

## 2022-06-06 PROCEDURE — 70450 CT HEAD/BRAIN W/O DYE: CPT

## 2022-06-06 PROCEDURE — 74011250636 HC RX REV CODE- 250/636: Performed by: FAMILY MEDICINE

## 2022-06-06 PROCEDURE — 90715 TDAP VACCINE 7 YRS/> IM: CPT | Performed by: FAMILY MEDICINE

## 2022-06-06 PROCEDURE — 72125 CT NECK SPINE W/O DYE: CPT

## 2022-06-06 PROCEDURE — 90471 IMMUNIZATION ADMIN: CPT

## 2022-06-06 PROCEDURE — 74011000250 HC RX REV CODE- 250: Performed by: FAMILY MEDICINE

## 2022-06-06 PROCEDURE — 75810000293 HC SIMP/SUPERF WND  RPR

## 2022-06-06 PROCEDURE — 99284 EMERGENCY DEPT VISIT MOD MDM: CPT

## 2022-06-06 RX ORDER — LIDOCAINE HYDROCHLORIDE 10 MG/ML
10 INJECTION, SOLUTION EPIDURAL; INFILTRATION; INTRACAUDAL; PERINEURAL ONCE
Status: DISCONTINUED | OUTPATIENT
Start: 2022-06-06 | End: 2022-06-07 | Stop reason: HOSPADM

## 2022-06-06 RX ADMIN — TETANUS TOXOID, REDUCED DIPHTHERIA TOXOID AND ACELLULAR PERTUSSIS VACCINE, ADSORBED 0.5 ML: 5; 2.5; 8; 8; 2.5 SUSPENSION INTRAMUSCULAR at 20:42

## 2022-06-06 RX ADMIN — Medication 5 ML: at 20:42

## 2022-06-06 NOTE — ED PROVIDER NOTES
Patient is a 25-year-old male with past medical history of diabetes who presents for evaluation of forehead laceration. He reports that just prior to arrival, he was taking the trash can out. The trash can had floor tiles and it. He accidentally tripped backwards in the bag with the tiles hit his head. He did have a loss of consciousness during the event. He does not take blood thinners for any reason. He reports that he is unsure of his last tetanus shot. He has no other complaints. 7 sutures, 50           Past Medical History:   Diagnosis Date    Diabetes ([facility] [address]        No past surgical history on file. Family History:   Problem Relation Age of Onset    Diabetes Father     Hypertension Father     Heart Disease Father        Social History     Socioeconomic History    Marital status:      Spouse name: Not on file    Number of children: Not on file    Years of education: Not on file    Highest education level: Not on file   Occupational History    Not on file   Tobacco Use    Smoking status: Current Every Day Smoker     Packs/day: 0.50     Years: 14.00     Pack years: 7.00     Types: Cigarettes    Smokeless tobacco: Never Used   Substance and Sexual Activity    Alcohol use: Yes     Alcohol/week: 0.0 standard drinks     Comment: Rarely.  Drug use: No    Sexual activity: Yes     Partners: Female   Other Topics Concern    Not on file   Social History Narrative    Not on file     Social Determinants of Health     Financial Resource Strain:     Difficulty of Paying Living Expenses: Not on file   Food Insecurity:     Worried About Running Out of Food in the Last Year: Not on file    Fadia of Food in the Last Year: Not on file   Transportation Needs:     Lack of Transportation (Medical): Not on file    Lack of Transportation (Non-Medical):  Not on file   Physical Activity:     Days of Exercise per Week: Not on file    Minutes of Exercise per Session: Not on file   Stress:  Feeling of Stress : Not on file   Social Connections:     Frequency of Communication with Friends and Family: Not on file    Frequency of Social Gatherings with Friends and Family: Not on file    Attends Confucianist Services: Not on file    Active Member of Clubs or Organizations: Not on file    Attends Club or Organization Meetings: Not on file    Marital Status: Not on file   Intimate Partner Violence:     Fear of Current or Ex-Partner: Not on file    Emotionally Abused: Not on file    Physically Abused: Not on file    Sexually Abused: Not on file   Housing Stability:     Unable to Pay for Housing in the Last Year: Not on file    Number of [place] in the Last Year: Not on file    Unstable Housing in the Last Year: Not on file         ALLERGIES: Patient has no known allergies. Review of Systems   Constitutional: Negative for unexpected weight change. HENT: Negative for congestion. Eyes: Negative for visual disturbance. Respiratory: Negative for cough, chest tightness and shortness of breath. Cardiovascular: Negative for chest pain. Gastrointestinal: Negative for abdominal pain, nausea and vomiting. Endocrine: Negative for polyuria. Genitourinary: Negative for dysuria and flank pain. Musculoskeletal: Negative for back pain. Skin: Positive for wound. Allergic/Immunologic: Negative for immunocompromised state. Neurological: Negative for dizziness and headaches. Hematological: Negative for adenopathy. Psychiatric/Behavioral: Negative for agitation. There were no vitals filed for this visit. Physical Exam  Vitals and nursing note reviewed. Constitutional:       Appearance: Normal appearance. He is normal weight. HENT:      Head:        Comments: Proximately 1.5 inch laceration to forehead, bleeding controlled. Eyes:      Extraocular Movements: Extraocular movements intact.       Conjunctiva/sclera: Conjunctivae normal.      Pupils: Pupils are equal, round, and reactive to light. Cardiovascular:      Rate and Rhythm: Normal rate. Pulmonary:      Effort: Pulmonary effort is normal. No respiratory distress. Abdominal:      General: Abdomen is flat. Musculoskeletal:         General: Normal range of motion. Cervical back: Normal range of motion and neck supple. No tenderness. Skin:     General: Skin is warm. Capillary Refill: Capillary refill takes less than 2 seconds. Neurological:      General: No focal deficit present. Mental Status: He is alert and oriented to person, place, and time. Mental status is at baseline. GCS: GCS eye subscore is 4. GCS verbal subscore is 5. GCS motor subscore is 6. Cranial Nerves: No cranial nerve deficit or facial asymmetry. Sensory: Sensation is intact. Motor: No weakness. Coordination: Coordination is intact. Finger-Nose-Finger Test normal.      Gait: Gait is intact. Gait normal.   Psychiatric:         Mood and Affect: Mood normal.         Behavior: Behavior normal.          MDM  Number of Diagnoses or Management Options  Facial laceration, initial encounter  Diagnosis management comments: Presenting with forehead laceration. Head CT and cervical spine of neck negative. Wound approximated with sutures. Educated patient on care of sutures. Advised to follow-up with primary care in 1 week for suture removal.  Discussed my clinical impression(s), any labs and/or radiology results with the patient. I answered any questions and addressed any concerns. Discussed the importance of following up with their primary care physician and/or specialist(s). Discussed signs or symptoms that would warrant return back to the ER for further evaluation. The patient is agreeable with discharge.        Amount and/or Complexity of Data Reviewed  Tests in the radiology section of CPT®: ordered and reviewed           Wound Closure by Adhesive    Date/Time: 6/6/2022 11:53 PM  Performed by: Johana Knight Nazia Mckee NP  Authorized by: Santi Ramirez NP     Consent:     Consent obtained:  Verbal    Consent given by:  Patient    Risks discussed:  Infection, pain and poor cosmetic result    Alternatives discussed:  No treatment  Anesthesia (see MAR for exact dosages): Anesthesia method:  Local infiltration and topical application    Topical anesthetic:  LET    Local anesthetic:  Lidocaine 1% w/o epi  Laceration details:     Location:  Face    Face location:  Forehead  Repair type:     Repair type:  Simple  Exploration:     Wound extent: no fascia violation noted, no foreign bodies/material noted, no muscle damage noted and no underlying fracture noted    Treatment:     Area cleansed with:  Saline    Amount of cleaning:  Standard    Irrigation solution:  Sterile saline    Irrigation method:  Syringe  Skin repair:     Repair method:  Sutures    Suture size:  5-0    Suture material:  Nylon    Suture technique:  Simple interrupted    Number of sutures:  7  Approximation:     Approximation:  Close  Post-procedure details:     Dressing:  Antibiotic ointment and non-adherent dressing    Patient tolerance of procedure:   Tolerated well, no immediate complications

## 2022-06-06 NOTE — ED TRIAGE NOTES
Pt presents to the ED with a laceration his forehead. Pt fell while taking out the trash can and cut his forehead and knuckles. Pt's son states he loss consciousness during the event. Denies any chronic medical problems. Teodoro Castellanos in triage assessing pt.

## 2022-09-02 DIAGNOSIS — E78.2 MIXED HYPERLIPIDEMIA: ICD-10-CM

## 2022-09-02 DIAGNOSIS — Z79.4 TYPE 2 DIABETES MELLITUS WITH HYPERGLYCEMIA, WITH LONG-TERM CURRENT USE OF INSULIN (HCC): ICD-10-CM

## 2022-09-02 DIAGNOSIS — D50.8 OTHER IRON DEFICIENCY ANEMIA: ICD-10-CM

## 2022-09-02 DIAGNOSIS — E11.65 TYPE 2 DIABETES MELLITUS WITH HYPERGLYCEMIA, WITH LONG-TERM CURRENT USE OF INSULIN (HCC): ICD-10-CM

## 2022-09-02 RX ORDER — INSULIN GLARGINE 100 [IU]/ML
20 INJECTION, SOLUTION SUBCUTANEOUS
Qty: 4 EACH | Refills: 5 | Status: SHIPPED | OUTPATIENT
Start: 2022-09-02 | End: 2022-10-27 | Stop reason: SDUPTHER

## 2022-09-02 RX ORDER — PEN NEEDLE, DIABETIC 30 GX3/16"
NEEDLE, DISPOSABLE MISCELLANEOUS
Qty: 1 EACH | Refills: 11 | Status: SHIPPED | OUTPATIENT
Start: 2022-09-02

## 2022-09-02 RX ORDER — INSULIN ASPART 100 [IU]/ML
20 INJECTION, SOLUTION INTRAVENOUS; SUBCUTANEOUS
Qty: 4 PEN | Refills: 5 | Status: SHIPPED | OUTPATIENT
Start: 2022-09-02 | End: 2022-10-27 | Stop reason: SDUPTHER

## 2022-09-02 RX ORDER — ATORVASTATIN CALCIUM 20 MG/1
20 TABLET, FILM COATED ORAL DAILY
Qty: 90 TABLET | Refills: 3 | Status: SHIPPED | OUTPATIENT
Start: 2022-09-02

## 2022-09-06 ENCOUNTER — TELEPHONE (OUTPATIENT)
Dept: FAMILY MEDICINE CLINIC | Age: 47
End: 2022-09-06

## 2022-09-06 NOTE — TELEPHONE ENCOUNTER
----- Message from Macie Taylor sent at 9/2/2022  9:51 AM EDT -----  Subject: Message to Provider    QUESTIONS  Information for Provider? patient is calling because he wants to know is   he needs to have labs before his appointment on 10/27. He needs ASL   accommodations and would prefer to be notified by email of the date and   time if he needs labs.   ---------------------------------------------------------------------------  --------------  [facility] [facility]  [phone]; Do not leave any message, patient will call back for answer  ---------------------------------------------------------------------------  --------------  SCRIPT ANSWERS  Relationship to Patient?  Self

## 2022-10-27 ENCOUNTER — OFFICE VISIT (OUTPATIENT)
Dept: FAMILY MEDICINE CLINIC | Age: 47
End: 2022-10-27
Payer: MEDICARE

## 2022-10-27 VITALS — HEIGHT: 69 IN | WEIGHT: 191 LBS | BODY MASS INDEX: 28.29 KG/M2

## 2022-10-27 DIAGNOSIS — Z79.4 TYPE 2 DIABETES MELLITUS WITH HYPERGLYCEMIA, WITH LONG-TERM CURRENT USE OF INSULIN (HCC): ICD-10-CM

## 2022-10-27 DIAGNOSIS — E11.65 TYPE 2 DIABETES MELLITUS WITH HYPERGLYCEMIA, WITH LONG-TERM CURRENT USE OF INSULIN (HCC): ICD-10-CM

## 2022-10-27 DIAGNOSIS — M79.2 NEUROPATHIC PAIN: Primary | ICD-10-CM

## 2022-10-27 PROCEDURE — G8432 DEP SCR NOT DOC, RNG: HCPCS | Performed by: FAMILY MEDICINE

## 2022-10-27 PROCEDURE — 3046F HEMOGLOBIN A1C LEVEL >9.0%: CPT | Performed by: FAMILY MEDICINE

## 2022-10-27 PROCEDURE — 2022F DILAT RTA XM EVC RTNOPTHY: CPT | Performed by: FAMILY MEDICINE

## 2022-10-27 PROCEDURE — 99214 OFFICE O/P EST MOD 30 MIN: CPT | Performed by: FAMILY MEDICINE

## 2022-10-27 PROCEDURE — G8427 DOCREV CUR MEDS BY ELIG CLIN: HCPCS | Performed by: FAMILY MEDICINE

## 2022-10-27 PROCEDURE — G8419 CALC BMI OUT NRM PARAM NOF/U: HCPCS | Performed by: FAMILY MEDICINE

## 2022-10-27 RX ORDER — PREGABALIN 75 MG/1
75 CAPSULE ORAL 2 TIMES DAILY
Qty: 60 CAPSULE | Refills: 1 | Status: SHIPPED | OUTPATIENT
Start: 2022-10-27

## 2022-10-27 RX ORDER — INSULIN GLARGINE 100 [IU]/ML
20 INJECTION, SOLUTION SUBCUTANEOUS
Qty: 4 EACH | Refills: 5 | Status: SHIPPED | OUTPATIENT
Start: 2022-10-27 | End: 2022-11-02 | Stop reason: SDUPTHER

## 2022-10-27 RX ORDER — INSULIN ASPART 100 [IU]/ML
20 INJECTION, SOLUTION INTRAVENOUS; SUBCUTANEOUS
Qty: 4 PEN | Refills: 5 | Status: SHIPPED | OUTPATIENT
Start: 2022-10-27 | End: 2022-11-02 | Stop reason: SDUPTHER

## 2022-10-27 NOTE — PROGRESS NOTES
Family Medicine Follow-Up Progress Note  Patient: Serena Mckeon  1975, 50 y.o., male  Encounter Date: 10/27/2022    ASSESSMENT & PLAN    ICD-10-CM ICD-9-CM    1. Neuropathic pain  M79.2 729.2 pregabalin (LYRICA) 75 mg capsule      2. Type 2 diabetes mellitus with hyperglycemia, with long-term current use of insulin ([facility])  E11.65 250.00 insulin glargine (LANTUS,BASAGLAR) 100 unit/mL (3 mL) inpn    Z79.4 790.29 insulin aspart U-100 (NOVOLOG) 100 unit/mL (3 mL) inpn     V58.67 empagliflozin (Jardiance) 25 mg tablet      REFERRAL TO ENDOCRINOLOGY      CBC W/O DIFF      HEMOGLOBIN A1C WITH EAG      CANCELED: HEMOGLOBIN A1C WITH EAG      CANCELED: CBC W/O DIFF          Orders Placed This Encounter    CBC W/O DIFF     Standing Status:   Future     Number of Occurrences:   1     Standing Expiration Date:   10/27/2023    HEMOGLOBIN A1C WITH EAG     Standing Status:   Future     Number of Occurrences:   1     Standing Expiration Date:   10/27/2023    Clement BONILLA     Referral Priority:   Routine     Referral Type:   Consultation     Referral Reason:   Specialty Services Required     Referred to Provider:   Malcolm Zacarias MD     Number of Visits Requested:   1    insulin glargine (LANTUS,BASAGLAR) 100 unit/mL (3 mL) inpn     Si Units by SubCUTAneous route nightly. Dispense:  4 Each     Refill:  5    insulin aspart U-100 (NOVOLOG) 100 unit/mL (3 mL) inpn     Si Units by SubCUTAneous route daily (with dinner). Dispense:  4 Pen     Refill:  5    empagliflozin (Jardiance) 25 mg tablet     Sig: Take 1 Tablet by mouth daily. Dispense:  90 Tablet     Refill:  1    pregabalin (LYRICA) 75 mg capsule     Sig: Take 1 Capsule by mouth two (2) times a day. Max Daily Amount: 150 mg.      Dispense:  60 Capsule     Refill:  1       Patient Instructions   LANTUS: GREY SHOT--give at night, goal sugar is 120 in the morning    You can increase your LANTUS nighttime long acting insulin by 2 Units every 2 Days for a goal of this 120 sugar in the morning  If you go TOO HIGH--your morning surga will be too low and you will DECREASE your lantus    If you are TOO LOW on your nighttime Cabezas Lantus shot  Your sugar will stay above 120 and we will keep increasing SLOWLY this dose    The blue novolog is for mealtime coverage to make sure that you aren't going too high  You can decrease from 20 at a time to 10 U at a time, I understand you had a low event and we want to limit this while controlling your sugar      I am increasing your JARDIANCE pill to 25 mg    If your A1c is still >7.5 we will add either trulicity weekly or ozempic weekly to try to improve long acting sugar control (these are NON INSULIN INJECTIONS)    I am referring you to endocrinology today so we can get tighter control    Please go for labs to the lab  Address:  [address] (2nd floor)  \"[facility]\"    CHIEF COMPLAINT  Chief Complaint   Patient presents with    Diabetes         SUBJECTIVE  Serena Mckeon is a 50 y.o. male presenting today for follow up  He is deaf and unfortunately we do not have an , we have    He tells me he did get the freestyle avinash but he was unable to use it because nobody taught him how. He tells me that he is instead pricking his fingers and he finds that his glucometer may be unreliable and that he gets a variety of readings in a short period of time  He had stopped taking his Lantus which for him is clancy because he thought it was making his sugar too low and had started taking only his NovoLog just with dinnertime.   The patient tells me he eats only about 1 time a day and he does notice that his sugar goes up when he eats rice or drink sugary drinks like soda  He does take the Jardiance  He did not unfortunately have his A1c done before this visit, it seems he might not be able to access his MyChart and we are going to work on this  He does not seem to be having side effects from his medication other than the hypoglycemia he had  Unfortunately I suspect that he is not adequately controlled  He did in the past see endocrinology but he is not seeing them presently  ROS  Review of Systems  A 12 point review of systems was negative except as noted here or in the HPI. OBJECTIVE  Visit Vitals  Ht 5' 9\" (1.753 m)   Wt 191 lb (86.6 kg)   BMI 28.21 kg/m²       Physical Exam  Vitals reviewed. Constitutional:       General: He is not in acute distress. Appearance: Normal appearance. He is normal weight. He is not ill-appearing, toxic-appearing or diaphoretic. HENT:      Head: Normocephalic and atraumatic. Ears:      Comments: Bilateral hearing aids in place     Mouth/Throat:      Mouth: Mucous membranes are moist.   Eyes:      General: No scleral icterus. Pulmonary:      Effort: Pulmonary effort is normal. No respiratory distress. Skin:     Coloration: Skin is not jaundiced or pale. Findings: No bruising, erythema, lesion or rash. Neurological:      General: No focal deficit present. Mental Status: He is alert. Cranial Nerves: No cranial nerve deficit. Gait: Gait normal.   Psychiatric:         Mood and Affect: Mood normal.         Behavior: Behavior normal.         Thought Content: Thought content normal.         Judgment: Judgment normal.       No results found for any visits on 10/27/22. HISTORICAL  Reviewed and updated today, and as noted below:    Past Medical History:   Diagnosis Date    Diabetes ([address]      History reviewed. No pertinent surgical history.   Family History   Problem Relation Age of Onset    Diabetes Father     Hypertension Father     Heart Disease Father      Social History     Tobacco Use   Smoking Status Every Day    Packs/day: 0.50    Years: 14.00    Pack years: 7.00    Types: Cigarettes   Smokeless Tobacco Never     Social History     Socioeconomic History    Marital status:    Tobacco Use    Smoking status: Every Day     Packs/day: 0.50 Years: 14.00     Pack years: 7.00     Types: Cigarettes    Smokeless tobacco: Never   Substance and Sexual Activity    Alcohol use: Yes     Alcohol/week: 0.0 standard drinks     Comment: Rarely. Drug use: No    Sexual activity: Yes     Partners: Female     No Known Allergies    LAB REVIEW  Lab Results   Component Value Date/Time    Sodium 137 04/29/2022 12:00 AM    Potassium 4.1 04/29/2022 12:00 AM    Chloride 101 04/29/2022 12:00 AM    CO2 22 04/29/2022 12:00 AM    Anion gap 6 09/18/2018 11:43 AM    Glucose 235 (H) 04/29/2022 12:00 AM    BUN 12 04/29/2022 12:00 AM    Creatinine 0.76 04/29/2022 12:00 AM    BUN/Creatinine ratio 16 04/29/2022 12:00 AM    GFR est  11/12/2021 11:20 AM    GFR est non-AA 92 11/12/2021 11:20 AM    Calcium 9.1 04/29/2022 12:00 AM    Bilirubin, total 1.3 (H) 04/29/2022 12:00 AM    Alk.  phosphatase 76 04/29/2022 12:00 AM    Protein, total 7.6 04/29/2022 12:00 AM    Albumin 4.4 04/29/2022 12:00 AM    Globulin 4.0 09/18/2018 11:43 AM    A-G Ratio 1.4 04/29/2022 12:00 AM    ALT (SGPT) 31 04/29/2022 12:00 AM     Lab Results   Component Value Date/Time    WBC 7.8 04/29/2022 12:00 AM    HGB 11.1 (L) 04/29/2022 12:00 AM    HCT 37.6 04/29/2022 12:00 AM    PLATELET 467 87/31/3281 12:00 AM    MCV 66 (L) 04/29/2022 12:00 AM     Lab Results   Component Value Date/Time    Hemoglobin A1c 9.2 (H) 04/29/2022 12:00 AM    Hemoglobin A1c (POC) 8.8 11/11/2014 09:50 AM     Lab Results   Component Value Date/Time    Cholesterol, total 128 04/29/2022 12:00 AM    HDL Cholesterol 40 04/29/2022 12:00 AM    LDL, calculated 70 04/29/2022 12:00 AM    LDL, calculated 100 (H) 11/11/2014 10:49 AM    VLDL, calculated 18 04/29/2022 12:00 AM    VLDL, calculated 28 11/11/2014 10:49 AM    Triglyceride 94 04/29/2022 12:00 AM           Shelby Melendez MD  [facility]  10/27/22 2:25 PM    Portions of this note may have been populated using smart dictation software and may have \"sounds-like\" errors present. Pt was counseled on risks, benefits and alternatives of treatment options. All questions were asked and answered and the patient was agreeable with the treatment plan as outlined.

## 2022-10-27 NOTE — PATIENT INSTRUCTIONS
LANTUS: GREY SHOT--give at night, goal sugar is 120 in the morning    You can increase your LANTUS nighttime long acting insulin by 2 Units every 2 Days for a goal of this 120 sugar in the morning  If you go TOO HIGH--your morning surga will be too low and you will DECREASE your lantus    If you are TOO LOW on your nighttime Cabezas Lantus shot  Your sugar will stay above 120 and we will keep increasing SLOWLY this dose    The blue novolog is for mealtime coverage to make sure that you aren't going too high  You can decrease from 20 at a time to 10 U at a time, I understand you had a low event and we want to limit this while controlling your sugar      I am increasing your JARDIANCE pill to 25 mg    If your A1c is still >7.5 we will add either trulicity weekly or ozempic weekly to try to improve long acting sugar control (these are NON INSULIN INJECTIONS)    I am referring you to endocrinology today so we can get tighter control    Please go for labs to the lab  Address:  [facility] [facility] [address] (2nd floor)  \"[facility]\"

## 2022-10-28 ENCOUNTER — TELEPHONE (OUTPATIENT)
Dept: FAMILY MEDICINE CLINIC | Age: 47
End: 2022-10-28

## 2022-11-02 DIAGNOSIS — Z79.4 TYPE 2 DIABETES MELLITUS WITH HYPERGLYCEMIA, WITH LONG-TERM CURRENT USE OF INSULIN (HCC): ICD-10-CM

## 2022-11-02 DIAGNOSIS — E11.65 TYPE 2 DIABETES MELLITUS WITH HYPERGLYCEMIA, WITH LONG-TERM CURRENT USE OF INSULIN (HCC): ICD-10-CM

## 2022-11-02 NOTE — TELEPHONE ENCOUNTER
All meds sent 6 days ago at visit  Please check with pharmacy that they are there  Please let patient know results  Refills should be on these so he can request future refills at pharmacy

## 2022-11-04 RX ORDER — INSULIN ASPART 100 [IU]/ML
20 INJECTION, SOLUTION INTRAVENOUS; SUBCUTANEOUS
Qty: 4 PEN | Refills: 0 | Status: SHIPPED | OUTPATIENT
Start: 2022-11-04

## 2022-11-04 RX ORDER — INSULIN GLARGINE 100 [IU]/ML
20 INJECTION, SOLUTION SUBCUTANEOUS
Qty: 4 EACH | Refills: 0 | Status: SHIPPED | OUTPATIENT
Start: 2022-11-04

## 2023-01-18 ENCOUNTER — TELEPHONE (OUTPATIENT)
Dept: FAMILY MEDICINE CLINIC | Age: 48
End: 2023-01-18

## 2023-01-30 DIAGNOSIS — Z79.4 TYPE 2 DIABETES MELLITUS WITH HYPERGLYCEMIA, WITH LONG-TERM CURRENT USE OF INSULIN (HCC): ICD-10-CM

## 2023-01-30 DIAGNOSIS — E11.65 TYPE 2 DIABETES MELLITUS WITH HYPERGLYCEMIA, WITH LONG-TERM CURRENT USE OF INSULIN (HCC): ICD-10-CM

## 2023-01-30 DIAGNOSIS — E78.2 MIXED HYPERLIPIDEMIA: ICD-10-CM

## 2023-01-30 RX ORDER — INSULIN ASPART 100 [IU]/ML
20 INJECTION, SOLUTION INTRAVENOUS; SUBCUTANEOUS
Qty: 4 PEN | Refills: 0 | Status: SHIPPED | OUTPATIENT
Start: 2023-01-30

## 2023-01-30 RX ORDER — INSULIN GLARGINE 100 [IU]/ML
20 INJECTION, SOLUTION SUBCUTANEOUS
Qty: 4 EACH | Refills: 0 | Status: SHIPPED | OUTPATIENT
Start: 2023-01-30

## 2023-01-30 RX ORDER — ATORVASTATIN CALCIUM 20 MG/1
20 TABLET, FILM COATED ORAL DAILY
Qty: 90 TABLET | Refills: 3 | Status: SHIPPED | OUTPATIENT
Start: 2023-01-30

## 2023-01-30 RX ORDER — PEN NEEDLE, DIABETIC 30 GX3/16"
NEEDLE, DISPOSABLE MISCELLANEOUS
Qty: 1 EACH | Refills: 11 | Status: SHIPPED | OUTPATIENT
Start: 2023-01-30

## 2023-01-31 ENCOUNTER — TELEPHONE (OUTPATIENT)
Dept: FAMILY MEDICINE CLINIC | Age: 48
End: 2023-01-31

## 2023-01-31 NOTE — TELEPHONE ENCOUNTER
Insulin  aspart U-100- Your request has been approved  PA Case: [id number], Status: Approved, Coverage Starts on: 8/25/2021 12:00:00 AM, Coverage Ends on: 11/24/2022 12:00:00 AM. No

## 2023-02-23 ENCOUNTER — OFFICE VISIT (OUTPATIENT)
Dept: FAMILY MEDICINE CLINIC | Age: 48
End: 2023-02-23
Payer: MEDICARE

## 2023-02-23 VITALS
SYSTOLIC BLOOD PRESSURE: 129 MMHG | HEART RATE: 95 BPM | OXYGEN SATURATION: 97 % | BODY MASS INDEX: 28.21 KG/M2 | HEIGHT: 69 IN | TEMPERATURE: 98.1 F | DIASTOLIC BLOOD PRESSURE: 87 MMHG

## 2023-02-23 DIAGNOSIS — Z12.5 SCREENING FOR PROSTATE CANCER: ICD-10-CM

## 2023-02-23 DIAGNOSIS — D50.8 OTHER IRON DEFICIENCY ANEMIA: ICD-10-CM

## 2023-02-23 DIAGNOSIS — Z12.11 SCREEN FOR COLON CANCER: ICD-10-CM

## 2023-02-23 DIAGNOSIS — M79.2 NEUROPATHIC PAIN: ICD-10-CM

## 2023-02-23 DIAGNOSIS — R06.09 DOE (DYSPNEA ON EXERTION): Primary | ICD-10-CM

## 2023-02-23 DIAGNOSIS — E78.2 MIXED HYPERLIPIDEMIA: ICD-10-CM

## 2023-02-23 DIAGNOSIS — Z79.4 TYPE 2 DIABETES MELLITUS WITH HYPERGLYCEMIA, WITH LONG-TERM CURRENT USE OF INSULIN (HCC): ICD-10-CM

## 2023-02-23 DIAGNOSIS — E11.65 TYPE 2 DIABETES MELLITUS WITH HYPERGLYCEMIA, WITH LONG-TERM CURRENT USE OF INSULIN (HCC): ICD-10-CM

## 2023-02-23 PROCEDURE — G8419 CALC BMI OUT NRM PARAM NOF/U: HCPCS | Performed by: FAMILY MEDICINE

## 2023-02-23 PROCEDURE — 99214 OFFICE O/P EST MOD 30 MIN: CPT | Performed by: FAMILY MEDICINE

## 2023-02-23 PROCEDURE — 3046F HEMOGLOBIN A1C LEVEL >9.0%: CPT | Performed by: FAMILY MEDICINE

## 2023-02-23 PROCEDURE — 2022F DILAT RTA XM EVC RTNOPTHY: CPT | Performed by: FAMILY MEDICINE

## 2023-02-23 PROCEDURE — G8510 SCR DEP NEG, NO PLAN REQD: HCPCS | Performed by: FAMILY MEDICINE

## 2023-02-23 PROCEDURE — G8427 DOCREV CUR MEDS BY ELIG CLIN: HCPCS | Performed by: FAMILY MEDICINE

## 2023-02-23 NOTE — PROGRESS NOTES
Family Medicine Follow-Up Progress Note  Patient: Mike Glover  1975, 50 y.o., male  Encounter Date: 2/23/2023    ASSESSMENT & PLAN    ICD-10-CM ICD-9-CM    1. TURNER (dyspnea on exertion)  R06.09 786.09 ECHO ADULT COMPLETE      2. Type 2 diabetes mellitus with hyperglycemia, with long-term current use of insulin ([facility])  E11.65 250.00 CBC WITH AUTOMATED DIFF    Z79.4 790.29 HEMOGLOBIN A1C WITH EAG     V58.67 MICROALBUMIN, UR, RAND W/ MICROALB/CREAT RATIO      3. Neuropathic pain  M79.2 729.2       4. Mixed hyperlipidemia  E78.2 272.2 LIPID PANEL      METABOLIC PANEL, COMPREHENSIVE      5. Other iron deficiency anemia  D50.8 280.8 CBC WITH AUTOMATED DIFF      FERRITIN      6. Screening for prostate cancer  Z12.5 V76.44       7.  Screen for colon cancer  Z12.11 V76.51 REFERRAL FOR COLONOSCOPY          Orders Placed This Encounter    CBC WITH AUTOMATED DIFF     Standing Status:   Future     Standing Expiration Date:   2/23/2024    FERRITIN     Standing Status:   Future     Standing Expiration Date:   2/23/2024    LIPID PANEL     Standing Status:   Future     Standing Expiration Date:   2/23/2024    HEMOGLOBIN A1C WITH EAG     Standing Status:   Future     Standing Expiration Date:   2/64/3992    METABOLIC PANEL, COMPREHENSIVE     Standing Status:   Future     Standing Expiration Date:   2/23/2024    MICROALBUMIN, UR, RAND W/ MICROALB/CREAT RATIO     Standing Status:   Future     Standing Expiration Date:   2/23/2024    Referral for Colonoscopy (options for GI, Colon &  Rectal Surgery, & General Surgery)     Referral Priority:   Routine     Referral Type:   Consultation     Referral Reason:   Specialty Services Required     Referred to Provider:   Viviana Porter MD     Number of Visits Requested:   1       Patient Instructions   Continue to take IRON  Continue to take Briana Forth (for diabetes) in the MORNING  Continue to take ATORVASTATIN (for vascular protection from diabetes) in the EVENING  Continue to take mealtime insulin at dinner an long acting insulin at bedtime    Please call [phone] for colonosco[py    Labs, fasting per my orders at [facility] lab    The hospital will call you to schedule your heart ultrasound (echo)    If you are not having diabetic nerve pain you do NOT need to take the lyrica Pregabalin capsules they are for neuropathic pain    CHIEF COMPLAINT  Chief Complaint   Patient presents with    Follow-up    Arm Pain     (Right)started about two weeks ago     [address] is a 50 y.o. male presenting today for follow up  Diabetes    Sugars controlled moderately  Hypoglycemia: none  Tolerating current treatment well  Current medications include   Key Antihyperglycemic Medications               insulin glargine (LANTUS,BASAGLAR) 100 unit/mL (3 mL) inpn (Taking) 20 Units by SubCUTAneous route nightly. insulin aspart U-100 (NOVOLOG) 100 unit/mL (3 mL) inpn (Taking) 20 Units by SubCUTAneous route daily (with dinner). empagliflozin (Jardiance) 25 mg tablet (Taking) Take 1 Tablet by mouth daily.           Patient says am numbers 140-160 usually, it fluctuates  Lab Results   Component Value Date/Time    Hemoglobin A1c 7.4 (H) 10/27/2022 03:07 PM    Hemoglobin A1c 9.2 (H) 04/29/2022 12:00 AM    Hemoglobin A1c 7.7 (H) 11/12/2021 11:20 AM    Glucose 235 (H) 04/29/2022 12:00 AM    Glucose  11/11/2014 10:03 AM    Microalb/Creat ratio (ug/mg creat.) 11 11/12/2021 11:20 AM    LDL, calculated 70 04/29/2022 12:00 AM    LDL, calculated 100 (H) 11/11/2014 10:49 AM    Creatinine 0.76 04/29/2022 12:00 AM     Lab Results   Component Value Date/Time    Microalb/Creat ratio (ug/mg creat.) 11 11/12/2021 11:20 AM       Last eye exam performed  less than 1 year ago and was normal.  Eye doctor: Dr Rojas Troncoso  Record needed: yes-request placed    COLONOSCOPY: never    ROS  Review of Systems  A 12 point review of systems was negative except as noted here or in the HPI.    OBJECTIVE  Visit Vitals  /87 (BP 1 Location: Left upper arm, BP Patient Position: Sitting, BP Cuff Size: Large adult)   Pulse 95   Temp 98.1 °F (36.7 °C) (Temporal)   Ht 5' 9\" (1.753 m)   SpO2 97%   BMI 28.21 kg/m²       Physical Exam  Vitals and nursing note reviewed. Constitutional:       General: He is not in acute distress. Appearance: Normal appearance. He is normal weight. He is not ill-appearing, toxic-appearing or diaphoretic. HENT:      Head: Normocephalic and atraumatic. Ears:      Comments: Bilateral hearing aids in place     Mouth/Throat:      Mouth: Mucous membranes are moist.   Eyes:      General: No scleral icterus. Neck:      Vascular: No carotid bruit. Cardiovascular:      Rate and Rhythm: Normal rate and regular rhythm. Pulses: Normal pulses. Heart sounds: Normal heart sounds. No murmur heard. No friction rub. No gallop. Pulmonary:      Effort: Pulmonary effort is normal. No respiratory distress. Musculoskeletal:      Right lower leg: No edema. Left lower leg: No edema. Skin:     Coloration: Skin is not jaundiced or pale. Findings: No bruising, erythema, lesion or rash. Comments: Scar of the right lateral epicondyles appears to be a healed surgical incision   Neurological:      General: No focal deficit present. Mental Status: He is alert. Cranial Nerves: No cranial nerve deficit. Gait: Gait normal.   Psychiatric:         Mood and Affect: Mood normal.         Behavior: Behavior normal.         Thought Content: Thought content normal.         Judgment: Judgment normal.       No results found for any visits on 02/23/23. HISTORICAL  Reviewed and updated today, and as noted below:    Past Medical History:   Diagnosis Date    Diabetes ([address]      History reviewed. No pertinent surgical history.   Family History   Problem Relation Age of Onset    Diabetes Father     Hypertension Father     Heart Disease Father      Social History     Tobacco Use   Smoking Status Former    Packs/day: 0.50    Years: 14.00    Pack years: 7.00    Types: Cigarettes    Quit date: 2018    Years since quittin.1   Smokeless Tobacco Never     Social History     Socioeconomic History    Marital status:    Tobacco Use    Smoking status: Former     Packs/day: 0.50     Years: 14.00     Pack years: 7.00     Types: Cigarettes     Quit date: 2018     Years since quittin.1    Smokeless tobacco: Never   Substance and Sexual Activity    Alcohol use: Yes     Alcohol/week: 0.0 standard drinks     Comment: Rarely. Drug use: No    Sexual activity: Yes     Partners: Female     No Known Allergies    LAB REVIEW  Lab Results   Component Value Date/Time    Sodium 137 2022 12:00 AM    Potassium 4.1 2022 12:00 AM    Chloride 101 2022 12:00 AM    CO2 22 2022 12:00 AM    Anion gap 6 2018 11:43 AM    Glucose 235 (H) 2022 12:00 AM    BUN 12 2022 12:00 AM    Creatinine 0.76 2022 12:00 AM    BUN/Creatinine ratio 16 2022 12:00 AM    GFR est  2021 11:20 AM    GFR est non-AA 92 2021 11:20 AM    Calcium 9.1 2022 12:00 AM    Bilirubin, total 1.3 (H) 2022 12:00 AM    Alk.  phosphatase 76 2022 12:00 AM    Protein, total 7.6 2022 12:00 AM    Albumin 4.4 2022 12:00 AM    Globulin 4.0 2018 11:43 AM    A-G Ratio 1.4 2022 12:00 AM    ALT (SGPT) 31 2022 12:00 AM     Lab Results   Component Value Date/Time    WBC 9.1 10/27/2022 03:07 PM    HGB 12.3 10/27/2022 03:07 PM    HCT 41.0 10/27/2022 03:07 PM    PLATELET 731  03:07 PM    MCV 66.7 (L) 10/27/2022 03:07 PM     Lab Results   Component Value Date/Time    Hemoglobin A1c 7.4 (H) 10/27/2022 03:07 PM    Hemoglobin A1c (POC) 8.8 2014 09:50 AM     Lab Results   Component Value Date/Time    Cholesterol, total 128 2022 12:00 AM    HDL Cholesterol 40 2022 12:00 AM    LDL, calculated 70 04/29/2022 12:00 AM    LDL, calculated 100 (H) 11/11/2014 10:49 AM    VLDL, calculated 18 04/29/2022 12:00 AM    VLDL, calculated 28 11/11/2014 10:49 AM    Triglyceride 94 04/29/2022 12:00 AM           Elias Jurado MD  [facility]  02/23/23 4:51 PM    Portions of this note may have been populated using smart dictation software and may have \"sounds-like\" errors present. Pt was counseled on risks, benefits and alternatives of treatment options. All questions were asked and answered and the patient was agreeable with the treatment plan as outlined.

## 2023-02-23 NOTE — PROGRESS NOTES
Identified pt with two pt identifiers. Reviewed record in preparation for visit and have obtained necessary documentation. All patient medications has been reviewed. Chief Complaint   Patient presents with    Follow-up    Arm Pain     (Right)started about two weeks ago     Additional information about chief complaint:    Visit Vitals  /87 (BP 1 Location: Left upper arm, BP Patient Position: Sitting, BP Cuff Size: Large adult)   Pulse 95   Temp 98.1 °F (36.7 °C) (Temporal)   Ht 5' 9\" (1.753 m)   SpO2 97%   BMI 28.21 kg/m²       Health Maintenance Due   Topic    COVID-19 Vaccine (1)    Pneumococcal 0-64 years (1 - PCV)    Foot Exam Q1     Eye Exam Retinal or Dilated     Hepatitis B Vaccine (1 of 3 - Risk 3-dose series)    Medicare Yearly Exam     Colorectal Cancer Screening Combo     Flu Vaccine (1)    Diabetic Alb to Cr ratio (uACR) test        1. Have you been to the ER, urgent care clinic since your last visit? Hospitalized since your last visit? no    2. Have you seen or consulted any other health care providers outside of the [address] [place] [address] since your last visit? Include any pap smears or colon screening.  no

## 2023-02-23 NOTE — LETTER
2/23/2023 5:06 PM    Mr. [address] Treviño [id number]    [facility]  [address]  [address] [facility]  [place]  Phone: [phone]  Fax: [phone]    2/23/2023     Todd King   1975   [id number] [address] [facility]      To Whom It May Concern,    The above-named patient is receiving medical care at [facility]. Please fax a copy of the following document(s) for continuity of care. Fax number :  [phone]     - Labs from the last 1 year  - Last Office Visit  - Available Imaging Reports    We look forward to partnering with you to provide collaborative patient care. Please reach out to our office at [phone] if there are questions regarding this request.    Sincerely,    Alexa Everett and Tobias Calvillo  Family Physicians  [facility]            Sincerely,      Erika Gr MD

## 2023-02-23 NOTE — PATIENT INSTRUCTIONS
Continue to take IRON  Continue to take Starlett Bras (for diabetes) in the MORNING  Continue to take ATORVASTATIN (for vascular protection from diabetes) in the EVENING  Continue to take mealtime insulin at dinner an long acting insulin at bedtime    Please call [phone] for colonosco[py    Labs, fasting per my orders at [facility] lab    [facility] will call you to schedule your heart ultrasound (echo)    If you are not having diabetic nerve pain you do NOT need to take the lyrica Pregabalin capsules they are for neuropathic pain

## 2023-02-23 NOTE — LETTER
2/23/2023 5:01 PM    Mr. Lizy Skaggs [address]      [facility]  [facility] [address] [address]  [address] [facility]  [place]  Phone: [phone]  Fax: [phone]    2/23/2023     Juany Auguste   1975   [id number] [address] [facility]      To Whom It May Concern,    The above-named patient is receiving medical care at [facility]. Please fax a copy of the following document(s) for continuity of care. Fax number :  [phone]     - Last Eye Exam (diabetic eye exam results please)    We look forward to partnering with you to provide collaborative patient care. Please reach out to our office at [phone] if there are questions regarding this request.    Sincerely,    Alexa Noriega and Tea Martell  Family Physicians  [facility]          Sincerely,      Mckayla Candelario MD

## 2023-02-28 ENCOUNTER — TELEPHONE (OUTPATIENT)
Dept: FAMILY MEDICINE CLINIC | Age: 48
End: 2023-02-28

## 2023-02-28 NOTE — TELEPHONE ENCOUNTER
Petrona Alvares with [facility]  Dr Geraldo Hein is calling to find out exactly what pt needs done, the patient was not clear on why she is going there. Please clarify pts needs.     Petrona Alvares [phone]  Fax # [phone]

## 2023-02-28 NOTE — TELEPHONE ENCOUNTER
Colon cancer screening    Referral in chart    Please let them know patient is deaf and requires ASL interpretation

## 2023-03-17 ENCOUNTER — HOSPITAL ENCOUNTER (OUTPATIENT)
Dept: NON INVASIVE DIAGNOSTICS | Age: 48
Discharge: HOME | End: 2023-03-17
Attending: FAMILY MEDICINE
Payer: MEDICARE

## 2023-03-17 VITALS
HEIGHT: 69 IN | SYSTOLIC BLOOD PRESSURE: 133 MMHG | WEIGHT: 191.8 LBS | BODY MASS INDEX: 28.41 KG/M2 | DIASTOLIC BLOOD PRESSURE: 90 MMHG

## 2023-03-17 DIAGNOSIS — R06.09 DOE (DYSPNEA ON EXERTION): ICD-10-CM

## 2023-03-17 PROCEDURE — 93306 TTE W/DOPPLER COMPLETE: CPT

## 2023-03-18 LAB
ECHO AO ASC DIAM: 3.6 CM
ECHO AO ASCENDING AORTA INDEX: 1.77 CM/M2
ECHO AV AREA PEAK VELOCITY: 2 CM2
ECHO AV AREA VTI: 2.4 CM2
ECHO AV AREA/BSA PEAK VELOCITY: 1 CM2/M2
ECHO AV AREA/BSA VTI: 1.2 CM2/M2
ECHO AV MEAN GRADIENT: 4 MMHG
ECHO AV MEAN VELOCITY: 1 M/S
ECHO AV PEAK GRADIENT: 8 MMHG
ECHO AV PEAK VELOCITY: 1.4 M/S
ECHO AV VELOCITY RATIO: 0.64
ECHO AV VTI: 24.2 CM
ECHO LA DIAMETER INDEX: 2.02 CM/M2
ECHO LA DIAMETER: 4.1 CM
ECHO LA VOL 2C: 52 ML (ref 18–58)
ECHO LA VOL 4C: 59 ML (ref 18–58)
ECHO LA VOL BP: 57 ML (ref 18–58)
ECHO LA VOL/BSA BIPLANE: 28 ML/M2 (ref 16–34)
ECHO LA VOLUME AREA LENGTH: 61 ML
ECHO LA VOLUME INDEX A2C: 26 ML/M2 (ref 16–34)
ECHO LA VOLUME INDEX A4C: 29 ML/M2 (ref 16–34)
ECHO LA VOLUME INDEX AREA LENGTH: 30 ML/M2 (ref 16–34)
ECHO LV E' LATERAL VELOCITY: 6 CM/S
ECHO LV E' SEPTAL VELOCITY: 5 CM/S
ECHO LV EDV A2C: 91 ML
ECHO LV EDV A4C: 95 ML
ECHO LV EDV BP: 87 ML (ref 67–155)
ECHO LV EDV INDEX A4C: 47 ML/M2
ECHO LV EDV INDEX BP: 43 ML/M2
ECHO LV EDV NDEX A2C: 45 ML/M2
ECHO LV EJECTION FRACTION A2C: 58 %
ECHO LV EJECTION FRACTION A4C: 54 %
ECHO LV EJECTION FRACTION BIPLANE: 52 % (ref 55–100)
ECHO LV ESV A2C: 39 ML
ECHO LV ESV A4C: 43 ML
ECHO LV ESV BP: 42 ML (ref 22–58)
ECHO LV ESV INDEX A2C: 19 ML/M2
ECHO LV ESV INDEX A4C: 21 ML/M2
ECHO LV ESV INDEX BP: 21 ML/M2
ECHO LV FRACTIONAL SHORTENING: 38 % (ref 28–44)
ECHO LV INTERNAL DIMENSION DIASTOLE INDEX: 2.56 CM/M2
ECHO LV INTERNAL DIMENSION DIASTOLIC: 5.2 CM (ref 4.2–5.9)
ECHO LV INTERNAL DIMENSION SYSTOLIC INDEX: 1.58 CM/M2
ECHO LV INTERNAL DIMENSION SYSTOLIC: 3.2 CM
ECHO LV IVSD: 1 CM (ref 0.6–1)
ECHO LV MASS 2D: 207.3 G (ref 88–224)
ECHO LV MASS INDEX 2D: 102.1 G/M2 (ref 49–115)
ECHO LV POSTERIOR WALL DIASTOLIC: 1.1 CM (ref 0.6–1)
ECHO LV RELATIVE WALL THICKNESS RATIO: 0.42
ECHO LVOT AREA: 3.1 CM2
ECHO LVOT AV VTI INDEX: 0.74
ECHO LVOT DIAM: 2 CM
ECHO LVOT MEAN GRADIENT: 2 MMHG
ECHO LVOT PEAK GRADIENT: 3 MMHG
ECHO LVOT PEAK VELOCITY: 0.9 M/S
ECHO LVOT STROKE VOLUME INDEX: 27.5 ML/M2
ECHO LVOT SV: 55.9 ML
ECHO LVOT VTI: 17.8 CM
ECHO MV A VELOCITY: 0.78 M/S
ECHO MV E DECELERATION TIME (DT): 163.8 MS
ECHO MV E VELOCITY: 0.57 M/S
ECHO MV E/A RATIO: 0.73
ECHO MV E/E' LATERAL: 9.5
ECHO MV E/E' RATIO (AVERAGED): 10.45
ECHO MV E/E' SEPTAL: 11.4
ECHO PV MAX VELOCITY: 1.2 M/S
ECHO PV PEAK GRADIENT: 6 MMHG
ECHO RV INTERNAL DIMENSION: 2.8 CM
ECHO RV TAPSE: 1.5 CM (ref 1.7–?)
ECHO RVOT MEAN GRADIENT: 1 MMHG
ECHO RVOT PEAK GRADIENT: 2 MMHG
ECHO RVOT PEAK VELOCITY: 0.8 M/S
ECHO RVOT VTI: 13.3 CM
ECHO TV REGURGITANT MAX VELOCITY: 1.44 M/S
ECHO TV REGURGITANT PEAK GRADIENT: 9 MMHG

## 2023-03-18 PROCEDURE — 93306 TTE W/DOPPLER COMPLETE: CPT | Performed by: INTERNAL MEDICINE

## 2023-03-27 ENCOUNTER — HOSPITAL ENCOUNTER (OUTPATIENT)
Age: 48
Setting detail: OUTPATIENT SURGERY
End: 2023-03-27
Attending: INTERNAL MEDICINE | Admitting: INTERNAL MEDICINE

## 2023-04-04 RX ORDER — SODIUM CHLORIDE 9 MG/ML
50 INJECTION, SOLUTION INTRAVENOUS CONTINUOUS
Start: 2023-04-04 | End: 2023-04-04

## 2023-04-04 RX ORDER — ATROPINE SULFATE 0.1 MG/ML
0.4 INJECTION INTRAVENOUS
Start: 2023-04-04 | End: 2023-04-05

## 2023-04-04 RX ORDER — MIDAZOLAM HYDROCHLORIDE 1 MG/ML
.25-5 INJECTION, SOLUTION INTRAMUSCULAR; INTRAVENOUS
Start: 2023-04-04

## 2023-04-04 RX ORDER — NALOXONE HYDROCHLORIDE 0.4 MG/ML
0.4 INJECTION, SOLUTION INTRAMUSCULAR; INTRAVENOUS; SUBCUTANEOUS
Start: 2023-04-04 | End: 2023-04-04

## 2023-04-04 RX ORDER — EPINEPHRINE 0.1 MG/ML
1 INJECTION INTRACARDIAC; INTRAVENOUS
Start: 2023-04-04 | End: 2023-04-05

## 2023-04-04 RX ORDER — FLUMAZENIL 0.1 MG/ML
0.2 INJECTION INTRAVENOUS
Start: 2023-04-04 | End: 2023-04-04

## 2023-04-04 RX ORDER — DEXTROMETHORPHAN/PSEUDOEPHED 2.5-7.5/.8
1.2 DROPS ORAL
Start: 2023-04-04

## 2023-04-04 NOTE — H&P
Serafin Ball M.D.  [phone]            History and Physical       NAME:  Sridevi Ross   :   1975   MRN:   [id number]       Referring Physician:  Indy Carias MD      Consult Date: 2023 9:10 AM    Chief Complaint:  Colon cancer screening    History of Present Illness:  Patient is a 50 y.o. who is seen for colon cancer screening. Denies any ongoing GI complaints. PMH:  Past Medical History:   Diagnosis Date    Diabetes ([address]        PSH:  No past surgical history on file. Allergies:  No Known Allergies    Home Medications:  Prior to Admission Medications   Prescriptions Last Dose Informant Patient Reported? Taking? Insulin Needles, Disposable, 31 gauge x \" ndle   No No   Sig: For use with insulin e11.9   atorvastatin (LIPITOR) 20 mg tablet   No No   Sig: Take 1 Tablet by mouth daily. empagliflozin (Jardiance) 25 mg tablet   No No   Sig: Take 1 Tablet by mouth daily. ferrous sulfate (SLOW FE) 142 mg (45 mg iron) ER tablet   No No   Sig: Take 1 Tablet by mouth Daily (before breakfast). flash glucose scanning reader (FreeStyle Nerissa 2 Magnolia) misc   No No   Sig: Use per package instructions e11.65   Patient not taking: Reported on 2023   flash glucose sensor (FreeStyle Nerissa 2 Sensor) kit   No No   Sig: Use per package instructions e11.65   Patient not taking: Reported on 2023   insulin aspart U-100 (NOVOLOG) 100 unit/mL (3 mL) inpn   No No   Si Units by SubCUTAneous route daily (with dinner). insulin glargine (LANTUS,BASAGLAR) 100 unit/mL (3 mL) inpn   No No   Si Units by SubCUTAneous route nightly. polyethylene glycol (MIRALAX) 17 gram/dose powder   Yes No   Sig: Take 17 g by mouth daily. DISSOLVE IN LIQUID   pregabalin (LYRICA) 75 mg capsule   No No   Sig: Take 1 Capsule by mouth two (2) times a day. Max Daily Amount: 150 mg.       Facility-Administered Medications: None       Hospital Medications:  No current facility-administered medications for this encounter. Social History:  Social History     Tobacco Use    Smoking status: Former     Packs/day: 0.50     Years: 14.00     Pack years: 7.00     Types: Cigarettes     Quit date:      Years since quittin.2    Smokeless tobacco: Never   Substance Use Topics    Alcohol use: Yes     Alcohol/week: 0.0 standard drinks     Comment: Rarely. Family History:  Family History   Problem Relation Age of Onset    Diabetes Father     Hypertension Father     Heart Disease Father              Review of Systems:      Constitutional: negative fever, negative chills, negative weight loss  Eyes:   negative visual changes  ENT:   negative sore throat, tongue or lip swelling  Respiratory:  negative cough, negative dyspnea  Cards:  negative for chest pain, palpitations, lower extremity edema  GI:   See HPI  :  negative for frequency, dysuria  Integument:  negative for rash and pruritus  Heme:  negative for easy bruising and gum/nose bleeding  Musculoskel: negative for myalgias,  back pain and muscle weakness  Neuro: negative for headaches, dizziness, vertigo  Psych:  negative for feelings of anxiety, depression       Objective:   No data found. No intake/output data recorded. No intake/output data recorded. EXAM:     NEURO-a&o   HEENT-wnl   LUNGS-clear    COR-regular rate and rhythym     ABD-soft , no tenderness, no rebound, good bs     EXT-no edema     Data Review     No results for input(s): WBC, HGB, HCT, PLT, HGBEXT, HCTEXT, PLTEXT in the last 72 hours. No results for input(s): NA, K, CL, CO2, BUN, CREA, GLU, PHOS, CA in the last 72 hours. No results for input(s): AP, TBIL, TP, ALB, GLOB, GGT, AML, LPSE in the last 72 hours. No lab exists for component: SGOT, GPT, AMYP, HLPSE  No results for input(s): INR, PTP, APTT, INREXT in the last 72 hours.     Patient Active Problem List   Diagnosis Code    Diabetes ([facility] [address] E11.9      Assessment:     Colon cancer screening Plan:     Colonoscopy today.      Signed By: Krishan Jacob MD     4/4/2023  9:10 AM

## 2023-04-05 ENCOUNTER — TELEPHONE (OUTPATIENT)
Dept: FAMILY MEDICINE CLINIC | Age: 48
End: 2023-04-05

## 2023-04-06 NOTE — TELEPHONE ENCOUNTER
Message sent to . Bart Prater per Dr. Jose Ruiz Please contact patient to inform him he needs to be evaluated by a physician prior to antibiotic prescription. If we do not have availabilities he will need to go to urgent care.
Pt came in to office asking for refill request of Doxycycline 100 mg capsules. I could not find in his med history, but he handed me a pipll bottle with the med list on it. Please send itt [facility] at [facility]. He says he has a sinus infection.         Jacqueline Bowens
Yes

## 2023-04-12 ENCOUNTER — HOSPITAL ENCOUNTER (OUTPATIENT)
Age: 48
Setting detail: OUTPATIENT SURGERY
End: 2023-04-12
Attending: INTERNAL MEDICINE | Admitting: INTERNAL MEDICINE

## 2023-04-15 DIAGNOSIS — Z79.4 TYPE 2 DIABETES MELLITUS WITH HYPERGLYCEMIA, WITH LONG-TERM CURRENT USE OF INSULIN (HCC): ICD-10-CM

## 2023-04-15 DIAGNOSIS — E11.65 TYPE 2 DIABETES MELLITUS WITH HYPERGLYCEMIA, WITH LONG-TERM CURRENT USE OF INSULIN (HCC): ICD-10-CM

## 2023-04-17 ENCOUNTER — TELEPHONE (OUTPATIENT)
Dept: FAMILY MEDICINE CLINIC | Age: 48
End: 2023-04-17

## 2023-04-17 RX ORDER — EMPAGLIFLOZIN 25 MG/1
TABLET, FILM COATED ORAL
Qty: 90 TABLET | Refills: 3 | Status: SHIPPED | OUTPATIENT
Start: 2023-04-17

## 2023-04-17 NOTE — TELEPHONE ENCOUNTER
Pt walked in requesting status of his Jardiance prescription after receiving message from Farzana Johnson they need to contact our office. Not sure if pt needs PA but he's concerned and may be out of medication. Pt hearing impaired and needs .  Tova

## 2023-04-28 DIAGNOSIS — E11.65 TYPE 2 DIABETES MELLITUS WITH HYPERGLYCEMIA, WITH LONG-TERM CURRENT USE OF INSULIN (HCC): ICD-10-CM

## 2023-04-28 DIAGNOSIS — Z79.4 TYPE 2 DIABETES MELLITUS WITH HYPERGLYCEMIA, WITH LONG-TERM CURRENT USE OF INSULIN (HCC): ICD-10-CM

## 2023-04-28 RX ORDER — EMPAGLIFLOZIN 25 MG/1
TABLET, FILM COATED ORAL
Qty: 90 TABLET | Refills: 0 | Status: SHIPPED | OUTPATIENT
Start: 2023-04-28

## 2023-05-17 ENCOUNTER — ANESTHESIA (OUTPATIENT)
Facility: HOSPITAL | Age: 48
End: 2023-05-17
Payer: MEDICARE

## 2023-05-17 ENCOUNTER — ANESTHESIA EVENT (OUTPATIENT)
Facility: HOSPITAL | Age: 48
End: 2023-05-17
Payer: MEDICARE

## 2023-05-17 ENCOUNTER — HOSPITAL ENCOUNTER (OUTPATIENT)
Facility: HOSPITAL | Age: 48
Setting detail: OUTPATIENT SURGERY
Discharge: HOME | End: 2023-05-17
Attending: INTERNAL MEDICINE | Admitting: INTERNAL MEDICINE
Payer: MEDICARE

## 2023-05-17 VITALS
DIASTOLIC BLOOD PRESSURE: 78 MMHG | TEMPERATURE: 97.6 F | RESPIRATION RATE: 19 BRPM | WEIGHT: 192.24 LBS | BODY MASS INDEX: 26.91 KG/M2 | OXYGEN SATURATION: 99 % | SYSTOLIC BLOOD PRESSURE: 106 MMHG | HEIGHT: 71 IN | HEART RATE: 82 BPM

## 2023-05-17 LAB
GLUCOSE BLD STRIP.AUTO-MCNC: 162 MG/DL (ref 65–117)
SERVICE CMNT-IMP: ABNORMAL

## 2023-05-17 PROCEDURE — 3600007502: Performed by: INTERNAL MEDICINE

## 2023-05-17 PROCEDURE — 2709999900 HC NON-CHARGEABLE SUPPLY: Performed by: INTERNAL MEDICINE

## 2023-05-17 PROCEDURE — 7100000011 HC PHASE II RECOVERY - ADDTL 15 MIN: Performed by: INTERNAL MEDICINE

## 2023-05-17 PROCEDURE — 82962 GLUCOSE BLOOD TEST: CPT

## 2023-05-17 PROCEDURE — 3700000000 HC ANESTHESIA ATTENDED CARE: Performed by: INTERNAL MEDICINE

## 2023-05-17 PROCEDURE — 88305 TISSUE EXAM BY PATHOLOGIST: CPT

## 2023-05-17 PROCEDURE — 7100000010 HC PHASE II RECOVERY - FIRST 15 MIN: Performed by: INTERNAL MEDICINE

## 2023-05-17 PROCEDURE — 3600007512: Performed by: INTERNAL MEDICINE

## 2023-05-17 PROCEDURE — C1889 IMPLANT/INSERT DEVICE, NOC: HCPCS | Performed by: INTERNAL MEDICINE

## 2023-05-17 PROCEDURE — 2580000003 HC RX 258: Performed by: NURSE ANESTHETIST, CERTIFIED REGISTERED

## 2023-05-17 PROCEDURE — 3700000001 HC ADD 15 MINUTES (ANESTHESIA): Performed by: INTERNAL MEDICINE

## 2023-05-17 DEVICE — WORKING LENGTH 235CM, WORKING CHANNEL 2.8MM
Type: IMPLANTABLE DEVICE | Site: ASCENDING COLON | Status: FUNCTIONAL
Brand: RESOLUTION 360 CLIP

## 2023-05-17 RX ORDER — SODIUM CHLORIDE 9 MG/ML
25 INJECTION, SOLUTION INTRAVENOUS PRN
Status: CANCELLED | OUTPATIENT
Start: 2023-05-17

## 2023-05-17 RX ORDER — INSULIN GLARGINE 100 [IU]/ML
20 INJECTION, SOLUTION SUBCUTANEOUS NIGHTLY
COMMUNITY
Start: 2023-03-18

## 2023-05-17 RX ORDER — SODIUM CHLORIDE 9 MG/ML
INJECTION, SOLUTION INTRAVENOUS CONTINUOUS PRN
Status: DISCONTINUED | OUTPATIENT
Start: 2023-05-17 | End: 2023-05-17 | Stop reason: SDUPTHER

## 2023-05-17 RX ORDER — SODIUM CHLORIDE 0.9 % (FLUSH) 0.9 %
5-40 SYRINGE (ML) INJECTION PRN
Status: CANCELLED | OUTPATIENT
Start: 2023-05-17

## 2023-05-17 RX ORDER — SODIUM CHLORIDE 0.9 % (FLUSH) 0.9 %
5-40 SYRINGE (ML) INJECTION EVERY 12 HOURS SCHEDULED
Status: CANCELLED | OUTPATIENT
Start: 2023-05-17

## 2023-05-17 RX ADMIN — SODIUM CHLORIDE: 9 INJECTION, SOLUTION INTRAVENOUS at 15:04

## 2023-05-17 ASSESSMENT — PAIN - FUNCTIONAL ASSESSMENT: PAIN_FUNCTIONAL_ASSESSMENT: NONE - DENIES PAIN

## 2023-05-17 NOTE — DISCHARGE INSTRUCTIONS
2023    Jean Power  :  1975  Sandie Medical Record Number:  [id number]      COLONOSCOPY FINDINGS:  Your colonoscopy showed: large polyp removed. DISCOMFORT:  Redness at IV site- apply warm compress to area; if redness or soreness persist- contact your physician  There may be a slight amount of blood passed from the rectum  Gaseous discomfort- walking, belching will help relieve any discomfort  You may not operate a vehicle for 12 hours  You may not engage in an occupation involving machinery or appliances for rest of today  You may not drink alcoholic beverages for at least 12 hours  Avoid making any critical decisions for at least 24 hour  DIET:   Liquid diet for next 24 hours. regular   - however -  remember your colon is empty and a heavy meal will produce gas. Avoid these foods:  vegetables, fried / greasy foods, carbonated drinks for today     ACTIVITY:  You may resume your normal daily activities it is recommended that you spend the remainder of the day resting -  avoid any strenuous activity. CALL M.D. ANY SIGN OF:   Increasing pain, nausea, vomiting  Abdominal distension (swelling)  New increased bleeding (oral or rectal)  Fever (chills)  Pain in chest area  Bloody discharge from nose or mouth   Shortness of breath    Follow-up Instructions:   Call Dr. Bear Foley if any questions or problems.    Telephone # [phone]  Biopsy results will be available in  5 to 7 days  Should have a repeat colonoscopy in 3 months

## 2023-05-17 NOTE — PROCEDURES
proximal ascending colon. Post polypectomy site was marked with [address] and also 4 hemoclips used to prevent post polypectomy bleeding    Recommendations:  -Await pathology. -Repeat colonoscopy in 3 months    Discharge Disposition:  Home in the company of a  when able to ambulate.     Roney Delaney MD  5/17/2023  3:50 PM

## 2023-05-17 NOTE — H&P
Betsey Peterson M.D.  [phone]            History and Physical       NAME:  Juany Jasmine   :   1975   MRN:   [id number]       Referring Physician:  Yesica Fuentes MD      Consult Date: 2023 12:53 PM    Chief Complaint:  Colon cancer screening    History of Present Illness:  Patient is a 50 y.o. who is seen for colon cancer screening. Denies any ongoing GI complaints. PMH:  Past Medical History:   Diagnosis Date    Diabetes ([facility] [address]        PSH:  No past surgical history on file. Allergies:  Not on File    Home Medications:  None       Hospital Medications:  No current facility-administered medications for this encounter. Social History:  Social History     Tobacco Use    Smoking status: Former     Packs/day: 0.50     Types: Cigarettes     Quit date: 2018     Years since quittin.3    Smokeless tobacco: Never   Substance Use Topics    Alcohol use: Yes     Alcohol/week: 0.0 standard drinks       Family History:  Family History   Problem Relation Age of Onset    Hypertension Father     Heart Disease Father     Diabetes Father              Review of Systems:      Constitutional: negative fever, negative chills, negative weight loss  Eyes:   negative visual changes  ENT:   negative sore throat, tongue or lip swelling  Respiratory:  negative cough, negative dyspnea  Cards:  negative for chest pain, palpitations, lower extremity edema  GI:   See HPI  :  negative for frequency, dysuria  Integument:  negative for rash and pruritus  Heme:  negative for easy bruising and gum/nose bleeding  Musculoskel: negative for myalgias,  back pain and muscle weakness  Neuro: negative for headaches, dizziness, vertigo  Psych:  negative for feelings of anxiety, depression       Objective:   No data found. No intake/output data recorded. No intake/output data recorded.     EXAM:     NEURO-a&o   HEENT-wnl   LUNGS-clear    COR-regular rate and rhythym     ABD-soft , no

## 2023-05-17 NOTE — ANESTHESIA PRE PROCEDURE
Department of Anesthesiology  Preprocedure Note       Name:  Zeke Kaplan   Age:  50 y.o.  :  1975                                          MRN:  [id number]         Date:  2023      Surgeon: Abdon Dunne):  Glenna Jackson MD    Procedure: Procedure(s):  COLONOSCOPY    Medications prior to admission:   Prior to Admission medications    Medication Sig Start Date End Date Taking? Authorizing Provider   empagliflozin (JARDIANCE) 25 MG tablet Take 1 tablet by mouth daily 23  Yes Historical Provider, MD   insulin aspart (NOVOLOG) 100 UNIT/ML injection pen Inject 20 Units into the skin Daily with supper 23  Yes Historical Provider, MD   LANTUS SOLOSTAR 100 UNIT/ML injection pen Inject 20 Units into the skin nightly 3/18/23   Historical Provider, MD       Current medications:    No current facility-administered medications for this encounter. Allergies:  No Known Allergies    Problem List:    Patient Active Problem List   Diagnosis Code    Diabetes ([facility] [address] E11.9       Past Medical History:        Diagnosis Date    Diabetes [facility])        Past Surgical History:  History reviewed. No pertinent surgical history.     Social History:    Social History     Tobacco Use    Smoking status: Former     Packs/day: 0.50     Types: Cigarettes     Quit date: 2018     Years since quittin.3    Smokeless tobacco: Never   Substance Use Topics    Alcohol use: Not Currently                                Counseling given: Not Answered      Vital Signs (Current):   Vitals:    23 1445   BP: 138/89   Pulse: 81   Resp: 25   Temp: 97.6 °F (36.4 °C)   TempSrc: Oral   SpO2: 97%   Weight: 87.2 kg (192 lb 3.9 oz)   Height: 1.803 m (5' 11\")                                              BP Readings from Last 3 Encounters:   23 138/89   23 129/87   22 104/79       NPO Status: Time of last liquid consumption: 0800                        Time of last solid consumption: 1900                        Date of

## 2023-05-17 NOTE — PROGRESS NOTES
Elly Morejon  1975  [id number]    Situation:  Verbal report received from: [address]  Procedure: Procedure(s):  COLONOSCOPY  COLONOSCOPY POLYPECTOMY SNARE/COLD BIOPSY  COLONOSCOPY CONTROL HEMORRHAGE/STOMA Clip x 3   INJECTION MEDICATION (Ink)  COLONOSCOPY FOREIGN BODY REMOVAL     Background:    Preoperative diagnosis: Screening for colon cancer [Z12.11]   Postoperative diagnosis: * No post-op diagnosis entered *     :  Dr. Isabelle Miranda  Assistant(s): Circulator: Alex Montes RN; Anirudh Mora RN     Specimens:   ID Type Source Tests Collected by Time Destination   A : Proximal Ascending Colon Polyp Tissue Colon-Ascending SURGICAL PATHOLOGY Jennifer Mckeon MD 5/17/2023 1535       H. Pylori  No    Assessment:  Intra-procedure medications       Anesthesia gave intra-procedure sedation and medications, see anesthesia flow sheet No    Intravenous fluids: NS@ KVO     Vital signs stable yes    Abdominal assessment: round and soft yes    Recommendation:  Discharge patient per MD order yes.   Return to floor na  Family or Friend family  Permission to share finding with family or friend Yes

## 2023-05-17 NOTE — PROGRESS NOTES
Endoscopy discharge instructions have been reviewed and given to patient and spouse. The patient and spouse verbalized understanding and acceptance of instructions. Dr. Armando Service discussed with patient/wife procedure findings and next steps. Bilateral hearing aid, wife has. She is at bedside.

## 2023-05-17 NOTE — PERIOP NOTE
Report from Jose C Hammer3, CRNA, see anesthesia record. ABD remains soft and non-tender post procedure. Pt has no complaints at this time and tolerated the procedure well. Endoscope was pre-cleaned at bedside immediately following procedure by [facility].

## 2023-05-18 RX ORDER — POLYETHYLENE GLYCOL 3350 17 G/17G
17 POWDER, FOR SOLUTION ORAL DAILY
COMMUNITY

## 2023-05-18 RX ORDER — ATORVASTATIN CALCIUM 20 MG/1
20 TABLET, FILM COATED ORAL DAILY
COMMUNITY
Start: 2023-01-30 | End: 2023-06-22 | Stop reason: SDUPTHER

## 2023-05-18 RX ORDER — INSULIN GLARGINE 100 [IU]/ML
20 INJECTION, SOLUTION SUBCUTANEOUS
COMMUNITY
Start: 2023-01-30 | End: 2023-06-22 | Stop reason: SDUPTHER

## 2023-05-18 RX ORDER — PREGABALIN 75 MG/1
75 CAPSULE ORAL 2 TIMES DAILY
COMMUNITY
Start: 2022-10-27 | End: 2023-06-05 | Stop reason: SDUPTHER

## 2023-05-25 NOTE — ANESTHESIA POSTPROCEDURE EVALUATION
Department of Anesthesiology  Postprocedure Note    Patient: Roxann Moseley  MRN: [id number]  YOB: 1975  Date of evaluation: 5/25/2023      Procedure Summary     Date: 05/17/23 Room / Location: [facility] ENDO 03 / [facility] ENDOSCOPY    Anesthesia Start: 1508 Anesthesia Stop: 1370    Procedures:       COLONOSCOPY (Lower GI Region)      COLONOSCOPY POLYPECTOMY SNARE/COLD BIOPSY (Lower GI Region)      COLONOSCOPY CONTROL HEMORRHAGE/STOMA Clip x 3  (Lower GI Region)      INJECTION MEDICATION (Ink) (Lower GI Region)      COLONOSCOPY FOREIGN BODY REMOVAL (Lower GI Region) Diagnosis:       Screening for colon cancer      (Screening for colon cancer [Z12.11])    Surgeons: Martinez Mayorga MD Responsible Provider: Sandi Corona DO    Anesthesia Type: MAC ASA Status: 2          Anesthesia Type: MAC    Viky Phase I: Viky Score: 9    Viky Phase II: Viky Score: 10      Anesthesia Post Evaluation    Comments: Patient discharged by PACU nursing without anesthesiologist evaluation.

## 2023-05-31 ENCOUNTER — TELEPHONE (OUTPATIENT)
Facility: CLINIC | Age: 48
End: 2023-05-31

## 2023-06-01 DIAGNOSIS — M79.2 NEURALGIA AND NEURITIS, UNSPECIFIED: Primary | ICD-10-CM

## 2023-06-01 RX ORDER — FLUTICASONE PROPIONATE 50 MCG
SPRAY, SUSPENSION (ML) NASAL
COMMUNITY
Start: 2023-04-24

## 2023-06-01 RX ORDER — CETIRIZINE HYDROCHLORIDE 10 MG/1
TABLET ORAL
COMMUNITY
Start: 2023-04-24

## 2023-06-05 RX ORDER — PREGABALIN 75 MG/1
75 CAPSULE ORAL 2 TIMES DAILY
Qty: 60 CAPSULE | Refills: 0 | Status: SHIPPED | OUTPATIENT
Start: 2023-06-05 | End: 2023-07-05

## 2023-06-22 ENCOUNTER — OFFICE VISIT (OUTPATIENT)
Facility: CLINIC | Age: 48
End: 2023-06-22
Payer: MEDICARE

## 2023-06-22 VITALS
BODY MASS INDEX: 27.52 KG/M2 | SYSTOLIC BLOOD PRESSURE: 108 MMHG | HEIGHT: 71 IN | OXYGEN SATURATION: 99 % | RESPIRATION RATE: 16 BRPM | DIASTOLIC BLOOD PRESSURE: 60 MMHG | WEIGHT: 196.6 LBS | TEMPERATURE: 98.5 F | HEART RATE: 92 BPM

## 2023-06-22 DIAGNOSIS — D12.6 TUBULOVILLOUS ADENOMA OF COLON: ICD-10-CM

## 2023-06-22 DIAGNOSIS — D50.8 OTHER IRON DEFICIENCY ANEMIAS: ICD-10-CM

## 2023-06-22 DIAGNOSIS — M79.2 NEURALGIA AND NEURITIS, UNSPECIFIED: ICD-10-CM

## 2023-06-22 DIAGNOSIS — Z79.4 TYPE 2 DIABETES MELLITUS WITH DIABETIC NEUROPATHY, WITH LONG-TERM CURRENT USE OF INSULIN (HCC): Primary | ICD-10-CM

## 2023-06-22 DIAGNOSIS — R06.09 DOE (DYSPNEA ON EXERTION): ICD-10-CM

## 2023-06-22 DIAGNOSIS — E11.40 TYPE 2 DIABETES MELLITUS WITH DIABETIC NEUROPATHY, WITH LONG-TERM CURRENT USE OF INSULIN (HCC): Primary | ICD-10-CM

## 2023-06-22 DIAGNOSIS — E78.2 MIXED HYPERLIPIDEMIA: ICD-10-CM

## 2023-06-22 PROCEDURE — 99215 OFFICE O/P EST HI 40 MIN: CPT | Performed by: FAMILY MEDICINE

## 2023-06-22 PROCEDURE — 3051F HG A1C>EQUAL 7.0%<8.0%: CPT | Performed by: FAMILY MEDICINE

## 2023-06-22 RX ORDER — BLOOD-GLUCOSE METER
EACH MISCELLANEOUS
Status: CANCELLED | OUTPATIENT
Start: 2023-06-22

## 2023-06-22 RX ORDER — GLUCOSAMINE HCL/CHONDROITIN SU 500-400 MG
CAPSULE ORAL
Qty: 400 STRIP | Refills: 3 | Status: SHIPPED | OUTPATIENT
Start: 2023-06-22

## 2023-06-22 RX ORDER — INSULIN GLARGINE 100 [IU]/ML
20 INJECTION, SOLUTION SUBCUTANEOUS NIGHTLY
Qty: 5 ADJUSTABLE DOSE PRE-FILLED PEN SYRINGE | Refills: 5 | Status: SHIPPED | OUTPATIENT
Start: 2023-06-22

## 2023-06-22 RX ORDER — ATORVASTATIN CALCIUM 20 MG/1
20 TABLET, FILM COATED ORAL DAILY
Qty: 90 TABLET | Refills: 3 | Status: SHIPPED | OUTPATIENT
Start: 2023-06-22

## 2023-06-22 RX ORDER — POLYETHYLENE GLYCOL-3350 AND ELECTROLYTES 236; 6.74; 5.86; 2.97; 22.74 G/274.31G; G/274.31G; G/274.31G; G/274.31G; G/274.31G
POWDER, FOR SOLUTION ORAL
COMMUNITY
Start: 2023-05-31

## 2023-06-22 RX ORDER — SODIUM, POTASSIUM,MAG SULFATES 17.5-3.13G
SOLUTION, RECONSTITUTED, ORAL ORAL
COMMUNITY
Start: 2023-04-06

## 2023-06-22 RX ORDER — BLOOD-GLUCOSE METER
EACH MISCELLANEOUS
COMMUNITY

## 2023-06-22 RX ORDER — PREGABALIN 75 MG/1
75 CAPSULE ORAL 2 TIMES DAILY
Qty: 60 CAPSULE | Refills: 0 | Status: SHIPPED | OUTPATIENT
Start: 2023-06-22 | End: 2023-07-22

## 2023-06-22 NOTE — PROGRESS NOTES
Chandrakant Person is a 50 y.o. male    Chief Complaint   Patient presents with    Diabetes       /60   Pulse 92   Temp 98.5 °F (36.9 °C)   Resp 16   Ht 5' 11\" (1.803 m)   Wt 196 lb 9.6 oz (89.2 kg)   SpO2 99%   BMI 27.42 kg/m²         1. Have you been to the ER, urgent care clinic since your last visit? Hospitalized since your last visit? No    2. Have you seen or consulted any other health care providers outside of the [address] [place] [address] since your last visit? Include any pap smears or colon screening.  No

## 2023-06-22 NOTE — PROGRESS NOTES
Family Medicine Follow-Up Progress Note  Patient: Taty Lovelace  1975, 50 y.o., male  Encounter Date: 6/22/2023     ASSESSMENT & PLAN    ICD-10-CM    1. Type 2 diabetes mellitus with diabetic neuropathy, with long-term current use of insulin (HCC)  E11.40 pregabalin (LYRICA) 75 MG capsule    Z79.4 Continuous Blood Gluc Sensor (FREESTYLE HODAN 2 SENSOR) [facility] ([address])     empagliflozin (JARDIANCE) 25 MG tablet     atorvastatin (LIPITOR) 20 MG tablet     ferrous sulfate (SLOW FE) 142 (45 Fe) MG extended release tablet     blood glucose monitor strips     insulin glargine (LANTUS SOLOSTAR) 100 UNIT/ML injection pen     insulin aspart (NOVOLOG) 100 UNIT/ML injection pen     CBC     Hemoglobin A1C      2. Neuralgia and neuritis, unspecified  M79.2 pregabalin (LYRICA) 75 MG capsule      3. Tubulovillous adenoma of colon  D12.6       4. Mixed hyperlipidemia  E78.2       5. Other iron deficiency anemias  D50.8 CBC     Ferritin      6. ROGERS (dyspnea on exertion)  R06.09 Exercise stress test          Orders Placed This Encounter   Procedures    CBC     Standing Status:   Future     Standing Expiration Date:   6/22/2024    Ferritin     Standing Status:   Future     Standing Expiration Date:   6/22/2024    Hemoglobin A1C    [facility] ([address]     Referral Priority:   Routine     Referral Type:   Eval and Treat     Referral Reason:   Specialty Services Required     Number of Visits Requested:   1       Patient Instructions   Insulin Instructions     Instructions for Lantus Insulin     This is the long-acting insulin. Inject Lantus at the same time each day. Your current dose is 20. If the BG before breakfast is higher than 90, add TWO units to the Lantus dose. This becomes your new dose. If the BG before breakfast is lower than 60, subtract TWO units from the Lantus dose and that will be your new dose.      Continue to adjust the

## 2023-06-22 NOTE — PATIENT INSTRUCTIONS
Here today in [place]  Accompanied by ASL interpretter  Dm is sounding to be better controlled  Compliance reported with Jardiance, lipitor and Iron  I believe lexx would benefit from DM education, he will be referred  He is unable to use michell due to difficulty with understanding/explaining instructions and would benefit from hands on help  He is not familiar with the names of meds during visit at times, but it seems to be he is using lantus at night, novolog with dinner and he can use the SSI instructions below to cover if he is having highs (I believe he has unprescribed semglee at home he is using and am unclear on when and how after our discussion)    He has opted to use the lyrica just PRN, he does not want to take this daily    He had a tubulovillous adenoma of the colon on recent cscope with high grade dysplasia and is disappointed to have to go back for follow up at short interval and has some apprehension regarding general anesthesia    He is advised to have CBC and ferritin rechecked with upcoming labs    He still has ROGERS, we did echo, he's had ekg, he may have Stress or proceed to see cardiology if this persists. 4 mo follow up with asl interpretter recommended      Insulin Instructions     Instructions for Lantus Insulin     This is the long-acting insulin. Inject Lantus at the same time each day. Your current dose is 20. If the BG before breakfast is higher than 90, add TWO units to the Lantus dose. This becomes your new dose. If the BG before breakfast is lower than 60, subtract TWO units from the Lantus dose and that will be your new dose. Continue to adjust the dose by 1-2 units every 2 days until your morning BG is in the target range of <90. If you should forget to take your Lantus, take your usual dose as soon as you realize it was missed. Gradually work back to taking it at your usual time, moving it by 2 - 3 hours each day.                     Instructions for Correction

## 2023-06-23 ENCOUNTER — TELEPHONE (OUTPATIENT)
Facility: CLINIC | Age: 48
End: 2023-06-23

## 2023-06-23 NOTE — TELEPHONE ENCOUNTER
Contour Test Strips     Your request has been approved    PA Case: [id number], Status: Approved, Coverage Starts on: 3/24/2023 12:00:00 AM, Coverage Ends on: 6/22/2024 12:00:00 AM.

## 2023-06-26 DIAGNOSIS — E11.40 TYPE 2 DIABETES MELLITUS WITH DIABETIC NEUROPATHY, WITH LONG-TERM CURRENT USE OF INSULIN (HCC): ICD-10-CM

## 2023-06-26 DIAGNOSIS — D50.8 OTHER IRON DEFICIENCY ANEMIAS: ICD-10-CM

## 2023-06-26 DIAGNOSIS — Z79.4 TYPE 2 DIABETES MELLITUS WITH DIABETIC NEUROPATHY, WITH LONG-TERM CURRENT USE OF INSULIN (HCC): ICD-10-CM

## 2023-06-26 LAB
ERYTHROCYTE [DISTWIDTH] IN BLOOD BY AUTOMATED COUNT: 18.3 % (ref 11.5–14.5)
FERRITIN SERPL-MCNC: 232 NG/ML (ref 26–388)
HCT VFR BLD AUTO: 40.7 % (ref 36.6–50.3)
HGB BLD-MCNC: 11.8 G/DL (ref 12.1–17)
MCH RBC QN AUTO: 19.3 PG (ref 26–34)
MCHC RBC AUTO-ENTMCNC: 29 G/DL (ref 30–36.5)
MCV RBC AUTO: 66.7 FL (ref 80–99)
NRBC # BLD: 0 K/UL (ref 0–0.01)
NRBC BLD-RTO: 0 PER 100 WBC
PLATELET # BLD AUTO: 403 K/UL (ref 150–400)
PMV BLD AUTO: 10.8 FL (ref 8.9–12.9)
RBC # BLD AUTO: 6.1 M/UL (ref 4.1–5.7)
WBC # BLD AUTO: 9.4 K/UL (ref 4.1–11.1)

## 2023-06-26 RX ORDER — INSULIN LISPRO 100 [IU]/ML
INJECTION, SOLUTION INTRAVENOUS; SUBCUTANEOUS
Qty: 5 ADJUSTABLE DOSE PRE-FILLED PEN SYRINGE | Refills: 5 | Status: SHIPPED | OUTPATIENT
Start: 2023-06-26

## 2023-09-05 RX ORDER — INSULIN GLARGINE 100 [IU]/ML
INJECTION, SOLUTION SUBCUTANEOUS
Qty: 12 ML | Refills: 0 | Status: SHIPPED | OUTPATIENT
Start: 2023-09-05

## 2023-09-22 NOTE — PERIOP NOTE
LM with Felicitas Stark at Dr. Linares Schooling office to clarify if pt should have ERAS orders/posting prior to PAT appt scheduling.  DOS 10/16/2023

## 2023-10-03 ENCOUNTER — HOSPITAL ENCOUNTER (OUTPATIENT)
Facility: HOSPITAL | Age: 48
Discharge: HOME | End: 2023-10-06
Payer: MEDICARE

## 2023-10-03 VITALS
SYSTOLIC BLOOD PRESSURE: 126 MMHG | HEIGHT: 71 IN | RESPIRATION RATE: 18 BRPM | BODY MASS INDEX: 27.51 KG/M2 | DIASTOLIC BLOOD PRESSURE: 79 MMHG | WEIGHT: 196.5 LBS | TEMPERATURE: 96.9 F | OXYGEN SATURATION: 99 % | HEART RATE: 84 BPM

## 2023-10-03 PROCEDURE — 71046 X-RAY EXAM CHEST 2 VIEWS: CPT

## 2023-10-03 NOTE — PERIOP NOTE
Pre-Operative Nutrition Score        Has the patient had an unplanned weight loss of 10%  of body weight or more in the last 6 months? No = 0    Has the patient lisandra eating less than 50% of their normal diet in the preceding week? No = 0    Patient instructed on Diabetic pre-operative nutrition plan to start 5 days prior to surgery. Patient given 10 shakes and 0 pre-surgery drinks. Opportunity given for questions and all questions answered. ERAS protocol and handbooks reviewed with patient. Handbooks given. Bowel prep reviewed. Supplements reviewed. All questions answered.

## 2023-10-10 ENCOUNTER — TELEPHONE (OUTPATIENT)
Facility: CLINIC | Age: 48
End: 2023-10-10

## 2023-10-10 NOTE — PERIOP NOTE
Patient arrived to [facility] with bowel prep and meds in hand. States he does not know what to do with the bowel prep or when to start it. Some confusion regarding home  meds. Patient brought to my office. Call placed to Dr. Jolynn Latham office regarding prep instructions. While on hold made a list of each medication that he could continue to take.  insulins and Jardiance and instructed that Dr. Melissa Almanza would need  to give him instructions for these meds. Patient verbalized understanding and said he had just visited Dr. Melissa Almanza and they were to email him instructions. During this time, we were holding for Dr. Melissa Almanza office. Phone directed to message machine after 15 minutes. Message was left. Verified patient email and instructed that I would email his prep instructions once I received them from Dr. Jolynn Latham. Communicated via lip reading and written notes. Patient was able to verbalize understanding of each discussion and asked appropriate questions.

## 2023-10-10 NOTE — TELEPHONE ENCOUNTER
Pt is having colonoscopy on 10/16 needs to know if any changes need to be made to his medications prior to procedure.     Yani Leong [phone]

## 2023-10-10 NOTE — TELEPHONE ENCOUNTER
Recommend taking 1/2 dose of insulin (lantus) if fasting for procedure   May resume all meds the day after procedure when eating as usual

## 2023-10-11 NOTE — PERIOP NOTE
MD Norah Ruiz RN  Oh ! I thought a colonoscopy per what we were told. Yes that changes things for me   Ok to hold jardiance 4 days prior to procedure to limit risk of ketoacidosis or UTI given the nature of the procedure     Did he do a preop visit with PAT or at an outside practice? I haven't seen him in 4 months it looks like! Thank you     Hermes Epperson           Previous Messages       ----- Message -----   From: Norah Navas RN   Sent: 10/11/2023   1:22 PM EDT   To: Jairo Saab MD   Subject: Insulin/Jardiance                                 Dr. Samantha Nesbitt,   Just wanted to clarify that Mr. Myriam Krabbe is having a Colectomy (not colonoscopy) on 10/16 with Dr. Jenniffer Grimm. I see that you instructed 1/2 dose of insulin if NPO. Do you have a recommendation for the Jardiance, given that it will be General anesthesia? I will be happy to give your instructions to patient. Thanks kindly,   Austen Bhatti RN         10/11/23 1600 Emailed instructions to Mr. Clemente Sidhu at provided email address.

## 2023-10-12 NOTE — PERIOP NOTE
Spoke with patients wife to verify that patient received Bowel prep instructions and diabetic mediation instructions that I had emailed to him. She confirmed that he did received those instructions.

## 2023-10-13 NOTE — PERIOP NOTE
Patient arrived at [facility] with bowel prep questions. [facility] at Dr. Alesia Henderson office to make aware that patient has bowel prep questions. Per Viktoriya Carrasco, patient, wife and son have been texted and emailed bowel prep instructions. Viktoriya Carrasco states she will call patient's wife and son to clarify any questions. Prep instructions were verbally reinforced with patient (patient reads lips).

## 2023-10-16 ENCOUNTER — ANESTHESIA EVENT (OUTPATIENT)
Facility: HOSPITAL | Age: 48
DRG: 331 | End: 2023-10-16
Payer: MEDICARE

## 2023-10-16 ENCOUNTER — HOSPITAL ENCOUNTER (INPATIENT)
Facility: HOSPITAL | Age: 48
LOS: 3 days | Discharge: HOME | DRG: 331 | End: 2023-10-19
Attending: COLON & RECTAL SURGERY | Admitting: COLON & RECTAL SURGERY
Payer: MEDICARE

## 2023-10-16 ENCOUNTER — ANESTHESIA (OUTPATIENT)
Facility: HOSPITAL | Age: 48
DRG: 331 | End: 2023-10-16
Payer: MEDICARE

## 2023-10-16 DIAGNOSIS — G89.18 POSTOPERATIVE PAIN: Primary | ICD-10-CM

## 2023-10-16 PROBLEM — K63.5 COLON POLYP: Status: ACTIVE | Noted: 2023-10-16

## 2023-10-16 LAB
GLUCOSE BLD STRIP.AUTO-MCNC: 220 MG/DL (ref 65–117)
GLUCOSE BLD STRIP.AUTO-MCNC: 235 MG/DL (ref 65–117)
GLUCOSE BLD STRIP.AUTO-MCNC: 256 MG/DL (ref 65–117)
GLUCOSE BLD STRIP.AUTO-MCNC: 264 MG/DL (ref 65–117)
GLUCOSE BLD STRIP.AUTO-MCNC: 303 MG/DL (ref 65–117)
SERVICE CMNT-IMP: ABNORMAL

## 2023-10-16 PROCEDURE — 6360000002 HC RX W HCPCS: Performed by: NURSE ANESTHETIST, CERTIFIED REGISTERED

## 2023-10-16 PROCEDURE — 2500000003 HC RX 250 WO HCPCS: Performed by: NURSE ANESTHETIST, CERTIFIED REGISTERED

## 2023-10-16 PROCEDURE — 3700000001 HC ADD 15 MINUTES (ANESTHESIA): Performed by: COLON & RECTAL SURGERY

## 2023-10-16 PROCEDURE — 6360000002 HC RX W HCPCS: Performed by: ANESTHESIOLOGY

## 2023-10-16 PROCEDURE — 6370000000 HC RX 637 (ALT 250 FOR IP): Performed by: COLON & RECTAL SURGERY

## 2023-10-16 PROCEDURE — 3700000000 HC ANESTHESIA ATTENDED CARE: Performed by: COLON & RECTAL SURGERY

## 2023-10-16 PROCEDURE — C9290 INJ, BUPIVACAINE LIPOSOME: HCPCS | Performed by: COLON & RECTAL SURGERY

## 2023-10-16 PROCEDURE — 2720000010 HC SURG SUPPLY STERILE: Performed by: COLON & RECTAL SURGERY

## 2023-10-16 PROCEDURE — 7100000000 HC PACU RECOVERY - FIRST 15 MIN: Performed by: COLON & RECTAL SURGERY

## 2023-10-16 PROCEDURE — 7100000001 HC PACU RECOVERY - ADDTL 15 MIN: Performed by: COLON & RECTAL SURGERY

## 2023-10-16 PROCEDURE — 6370000000 HC RX 637 (ALT 250 FOR IP): Performed by: INTERNAL MEDICINE

## 2023-10-16 PROCEDURE — 1100000000 HC RM PRIVATE

## 2023-10-16 PROCEDURE — 6370000000 HC RX 637 (ALT 250 FOR IP): Performed by: ANESTHESIOLOGY

## 2023-10-16 PROCEDURE — 2580000003 HC RX 258: Performed by: COLON & RECTAL SURGERY

## 2023-10-16 PROCEDURE — 82962 GLUCOSE BLOOD TEST: CPT

## 2023-10-16 PROCEDURE — 6360000002 HC RX W HCPCS: Performed by: COLON & RECTAL SURGERY

## 2023-10-16 PROCEDURE — 0DTF4ZZ RESECTION OF RIGHT LARGE INTESTINE, PERCUTANEOUS ENDOSCOPIC APPROACH: ICD-10-PCS | Performed by: COLON & RECTAL SURGERY

## 2023-10-16 PROCEDURE — 2580000003 HC RX 258: Performed by: ANESTHESIOLOGY

## 2023-10-16 PROCEDURE — 2709999900 HC NON-CHARGEABLE SUPPLY: Performed by: COLON & RECTAL SURGERY

## 2023-10-16 PROCEDURE — 88307 TISSUE EXAM BY PATHOLOGIST: CPT

## 2023-10-16 PROCEDURE — 3600000014 HC SURGERY LEVEL 4 ADDTL 15MIN: Performed by: COLON & RECTAL SURGERY

## 2023-10-16 PROCEDURE — 3600000004 HC SURGERY LEVEL 4 BASE: Performed by: COLON & RECTAL SURGERY

## 2023-10-16 RX ORDER — METRONIDAZOLE 500 MG/100ML
500 INJECTION, SOLUTION INTRAVENOUS
Status: COMPLETED | OUTPATIENT
Start: 2023-10-16 | End: 2023-10-16

## 2023-10-16 RX ORDER — ONDANSETRON 2 MG/ML
4 INJECTION INTRAMUSCULAR; INTRAVENOUS
Status: DISCONTINUED | OUTPATIENT
Start: 2023-10-16 | End: 2023-10-16 | Stop reason: HOSPADM

## 2023-10-16 RX ORDER — OXYCODONE HYDROCHLORIDE 5 MG/1
5 TABLET ORAL EVERY 4 HOURS PRN
Status: DISCONTINUED | OUTPATIENT
Start: 2023-10-16 | End: 2023-10-19 | Stop reason: HOSPADM

## 2023-10-16 RX ORDER — SODIUM CHLORIDE 0.9 % (FLUSH) 0.9 %
5-40 SYRINGE (ML) INJECTION EVERY 12 HOURS SCHEDULED
Status: DISCONTINUED | OUTPATIENT
Start: 2023-10-16 | End: 2023-10-19 | Stop reason: HOSPADM

## 2023-10-16 RX ORDER — SODIUM CHLORIDE 0.9 % (FLUSH) 0.9 %
5-40 SYRINGE (ML) INJECTION PRN
Status: DISCONTINUED | OUTPATIENT
Start: 2023-10-16 | End: 2023-10-19 | Stop reason: HOSPADM

## 2023-10-16 RX ORDER — MAGNESIUM SULFATE IN WATER 40 MG/ML
INJECTION, SOLUTION INTRAVENOUS PRN
Status: DISCONTINUED | OUTPATIENT
Start: 2023-10-16 | End: 2023-10-16 | Stop reason: SDUPTHER

## 2023-10-16 RX ORDER — DEXAMETHASONE SODIUM PHOSPHATE 4 MG/ML
INJECTION, SOLUTION INTRA-ARTICULAR; INTRALESIONAL; INTRAMUSCULAR; INTRAVENOUS; SOFT TISSUE PRN
Status: DISCONTINUED | OUTPATIENT
Start: 2023-10-16 | End: 2023-10-16 | Stop reason: SDUPTHER

## 2023-10-16 RX ORDER — INSULIN LISPRO 100 [IU]/ML
0-4 INJECTION, SOLUTION INTRAVENOUS; SUBCUTANEOUS NIGHTLY
Status: DISCONTINUED | OUTPATIENT
Start: 2023-10-16 | End: 2023-10-19 | Stop reason: HOSPADM

## 2023-10-16 RX ORDER — FLUTICASONE PROPIONATE 50 MCG
1 SPRAY, SUSPENSION (ML) NASAL DAILY
Status: DISCONTINUED | OUTPATIENT
Start: 2023-10-17 | End: 2023-10-19 | Stop reason: HOSPADM

## 2023-10-16 RX ORDER — MORPHINE SULFATE 0.5 MG/ML
INJECTION, SOLUTION EPIDURAL; INTRATHECAL; INTRAVENOUS PRN
Status: DISCONTINUED | OUTPATIENT
Start: 2023-10-16 | End: 2023-10-16

## 2023-10-16 RX ORDER — LIDOCAINE HYDROCHLORIDE 10 MG/ML
1 INJECTION, SOLUTION EPIDURAL; INFILTRATION; INTRACAUDAL; PERINEURAL
Status: DISCONTINUED | OUTPATIENT
Start: 2023-10-16 | End: 2023-10-16 | Stop reason: HOSPADM

## 2023-10-16 RX ORDER — LABETALOL HYDROCHLORIDE 5 MG/ML
10 INJECTION, SOLUTION INTRAVENOUS
Status: DISCONTINUED | OUTPATIENT
Start: 2023-10-16 | End: 2023-10-16 | Stop reason: HOSPADM

## 2023-10-16 RX ORDER — SODIUM CHLORIDE 9 MG/ML
INJECTION, SOLUTION INTRAVENOUS PRN
Status: DISCONTINUED | OUTPATIENT
Start: 2023-10-16 | End: 2023-10-19 | Stop reason: HOSPADM

## 2023-10-16 RX ORDER — ENOXAPARIN SODIUM 100 MG/ML
40 INJECTION SUBCUTANEOUS DAILY
Status: DISCONTINUED | OUTPATIENT
Start: 2023-10-17 | End: 2023-10-19 | Stop reason: HOSPADM

## 2023-10-16 RX ORDER — PREGABALIN 75 MG/1
75 CAPSULE ORAL 2 TIMES DAILY
Status: DISCONTINUED | OUTPATIENT
Start: 2023-10-16 | End: 2023-10-19 | Stop reason: HOSPADM

## 2023-10-16 RX ORDER — FENTANYL CITRATE 50 UG/ML
INJECTION, SOLUTION INTRAMUSCULAR; INTRAVENOUS PRN
Status: DISCONTINUED | OUTPATIENT
Start: 2023-10-16 | End: 2023-10-16 | Stop reason: SDUPTHER

## 2023-10-16 RX ORDER — ONDANSETRON 2 MG/ML
4 INJECTION INTRAMUSCULAR; INTRAVENOUS EVERY 6 HOURS PRN
Status: DISCONTINUED | OUTPATIENT
Start: 2023-10-16 | End: 2023-10-19 | Stop reason: HOSPADM

## 2023-10-16 RX ORDER — ROCURONIUM BROMIDE 10 MG/ML
INJECTION, SOLUTION INTRAVENOUS PRN
Status: DISCONTINUED | OUTPATIENT
Start: 2023-10-16 | End: 2023-10-16 | Stop reason: SDUPTHER

## 2023-10-16 RX ORDER — DEXTROSE MONOHYDRATE 100 MG/ML
INJECTION, SOLUTION INTRAVENOUS CONTINUOUS PRN
Status: DISCONTINUED | OUTPATIENT
Start: 2023-10-16 | End: 2023-10-19 | Stop reason: HOSPADM

## 2023-10-16 RX ORDER — MEPERIDINE HYDROCHLORIDE 25 MG/ML
12.5 INJECTION INTRAMUSCULAR; INTRAVENOUS; SUBCUTANEOUS EVERY 5 MIN PRN
Status: DISCONTINUED | OUTPATIENT
Start: 2023-10-16 | End: 2023-10-16 | Stop reason: HOSPADM

## 2023-10-16 RX ORDER — MIDAZOLAM HYDROCHLORIDE 1 MG/ML
INJECTION INTRAMUSCULAR; INTRAVENOUS PRN
Status: DISCONTINUED | OUTPATIENT
Start: 2023-10-16 | End: 2023-10-16 | Stop reason: SDUPTHER

## 2023-10-16 RX ORDER — SODIUM CHLORIDE 9 MG/ML
INJECTION, SOLUTION INTRAVENOUS CONTINUOUS
Status: DISCONTINUED | OUTPATIENT
Start: 2023-10-16 | End: 2023-10-17

## 2023-10-16 RX ORDER — DIPHENHYDRAMINE HYDROCHLORIDE 50 MG/ML
12.5 INJECTION INTRAMUSCULAR; INTRAVENOUS
Status: DISCONTINUED | OUTPATIENT
Start: 2023-10-16 | End: 2023-10-16 | Stop reason: HOSPADM

## 2023-10-16 RX ORDER — GLUCAGON 1 MG
1 KIT INJECTION PRN
Status: DISCONTINUED | OUTPATIENT
Start: 2023-10-16 | End: 2023-10-19 | Stop reason: HOSPADM

## 2023-10-16 RX ORDER — INSULIN LISPRO 100 [IU]/ML
0-8 INJECTION, SOLUTION INTRAVENOUS; SUBCUTANEOUS
Status: DISCONTINUED | OUTPATIENT
Start: 2023-10-16 | End: 2023-10-19 | Stop reason: HOSPADM

## 2023-10-16 RX ORDER — MORPHINE SULFATE 0.5 MG/ML
INJECTION, SOLUTION EPIDURAL; INTRATHECAL; INTRAVENOUS
Status: COMPLETED | OUTPATIENT
Start: 2023-10-16 | End: 2023-10-16

## 2023-10-16 RX ORDER — ONDANSETRON 2 MG/ML
INJECTION INTRAMUSCULAR; INTRAVENOUS PRN
Status: DISCONTINUED | OUTPATIENT
Start: 2023-10-16 | End: 2023-10-16 | Stop reason: SDUPTHER

## 2023-10-16 RX ORDER — ONDANSETRON 4 MG/1
4 TABLET, ORALLY DISINTEGRATING ORAL EVERY 8 HOURS PRN
Status: DISCONTINUED | OUTPATIENT
Start: 2023-10-16 | End: 2023-10-19 | Stop reason: HOSPADM

## 2023-10-16 RX ORDER — LIDOCAINE HYDROCHLORIDE 20 MG/ML
INJECTION, SOLUTION EPIDURAL; INFILTRATION; INTRACAUDAL; PERINEURAL PRN
Status: DISCONTINUED | OUTPATIENT
Start: 2023-10-16 | End: 2023-10-16 | Stop reason: SDUPTHER

## 2023-10-16 RX ORDER — POTASSIUM CHLORIDE 7.45 MG/ML
10 INJECTION INTRAVENOUS PRN
Status: DISCONTINUED | OUTPATIENT
Start: 2023-10-16 | End: 2023-10-18

## 2023-10-16 RX ORDER — KETAMINE HYDROCHLORIDE 10 MG/ML
INJECTION INTRAMUSCULAR; INTRAVENOUS PRN
Status: DISCONTINUED | OUTPATIENT
Start: 2023-10-16 | End: 2023-10-16 | Stop reason: SDUPTHER

## 2023-10-16 RX ORDER — ACETAMINOPHEN 325 MG/1
650 TABLET ORAL ONCE
Status: COMPLETED | OUTPATIENT
Start: 2023-10-16 | End: 2023-10-16

## 2023-10-16 RX ORDER — SODIUM CHLORIDE, SODIUM LACTATE, POTASSIUM CHLORIDE, CALCIUM CHLORIDE 600; 310; 30; 20 MG/100ML; MG/100ML; MG/100ML; MG/100ML
INJECTION, SOLUTION INTRAVENOUS CONTINUOUS
Status: DISCONTINUED | OUTPATIENT
Start: 2023-10-16 | End: 2023-10-16 | Stop reason: HOSPADM

## 2023-10-16 RX ORDER — ACETAMINOPHEN 325 MG/1
650 TABLET ORAL EVERY 6 HOURS
Status: DISCONTINUED | OUTPATIENT
Start: 2023-10-16 | End: 2023-10-17

## 2023-10-16 RX ORDER — OXYCODONE HYDROCHLORIDE 5 MG/1
10 TABLET ORAL EVERY 4 HOURS PRN
Status: DISCONTINUED | OUTPATIENT
Start: 2023-10-16 | End: 2023-10-19 | Stop reason: HOSPADM

## 2023-10-16 RX ORDER — LIDOCAINE HYDROCHLORIDE ANHYDROUS AND DEXTROSE MONOHYDRATE 5; 400 G/100ML; MG/100ML
INJECTION, SOLUTION INTRAVENOUS CONTINUOUS PRN
Status: DISCONTINUED | OUTPATIENT
Start: 2023-10-16 | End: 2023-10-16 | Stop reason: SDUPTHER

## 2023-10-16 RX ORDER — DROPERIDOL 2.5 MG/ML
0.62 INJECTION, SOLUTION INTRAMUSCULAR; INTRAVENOUS
Status: DISCONTINUED | OUTPATIENT
Start: 2023-10-16 | End: 2023-10-16 | Stop reason: HOSPADM

## 2023-10-16 RX ORDER — INSULIN LISPRO 100 [IU]/ML
4 INJECTION, SOLUTION INTRAVENOUS; SUBCUTANEOUS ONCE
Status: COMPLETED | OUTPATIENT
Start: 2023-10-16 | End: 2023-10-16

## 2023-10-16 RX ORDER — PROPOFOL 10 MG/ML
INJECTION, EMULSION INTRAVENOUS PRN
Status: DISCONTINUED | OUTPATIENT
Start: 2023-10-16 | End: 2023-10-16 | Stop reason: SDUPTHER

## 2023-10-16 RX ADMIN — LIDOCAINE HYDROCHLORIDE 75 MG: 20 INJECTION, SOLUTION EPIDURAL; INFILTRATION; INTRACAUDAL; PERINEURAL at 07:48

## 2023-10-16 RX ADMIN — SODIUM CHLORIDE, POTASSIUM CHLORIDE, SODIUM LACTATE AND CALCIUM CHLORIDE: 600; 310; 30; 20 INJECTION, SOLUTION INTRAVENOUS at 06:56

## 2023-10-16 RX ADMIN — PROPOFOL 30 MG: 10 INJECTION, EMULSION INTRAVENOUS at 10:34

## 2023-10-16 RX ADMIN — HYDROMORPHONE HYDROCHLORIDE 0.5 MG: 1 INJECTION, SOLUTION INTRAMUSCULAR; INTRAVENOUS; SUBCUTANEOUS at 11:12

## 2023-10-16 RX ADMIN — SODIUM CHLORIDE, POTASSIUM CHLORIDE, SODIUM LACTATE AND CALCIUM CHLORIDE: 600; 310; 30; 20 INJECTION, SOLUTION INTRAVENOUS at 08:05

## 2023-10-16 RX ADMIN — MORPHINE SULFATE 0.1 MG: 0.5 INJECTION, SOLUTION EPIDURAL; INTRATHECAL; INTRAVENOUS at 07:29

## 2023-10-16 RX ADMIN — ROCURONIUM BROMIDE 10 MG: 10 INJECTION INTRAVENOUS at 09:47

## 2023-10-16 RX ADMIN — SODIUM CHLORIDE, POTASSIUM CHLORIDE, SODIUM LACTATE AND CALCIUM CHLORIDE: 600; 310; 30; 20 INJECTION, SOLUTION INTRAVENOUS at 09:51

## 2023-10-16 RX ADMIN — METRONIDAZOLE 500 MG: 500 INJECTION, SOLUTION INTRAVENOUS at 08:00

## 2023-10-16 RX ADMIN — MIDAZOLAM HYDROCHLORIDE 2 MG: 1 INJECTION, SOLUTION INTRAMUSCULAR; INTRAVENOUS at 07:18

## 2023-10-16 RX ADMIN — ONDANSETRON 4 MG: 2 INJECTION INTRAMUSCULAR; INTRAVENOUS at 10:20

## 2023-10-16 RX ADMIN — SODIUM CHLORIDE, PRESERVATIVE FREE 10 ML: 5 INJECTION INTRAVENOUS at 22:38

## 2023-10-16 RX ADMIN — INSULIN LISPRO 4 UNITS: 100 INJECTION, SOLUTION INTRAVENOUS; SUBCUTANEOUS at 13:20

## 2023-10-16 RX ADMIN — ONDANSETRON 4 MG: 2 INJECTION INTRAMUSCULAR; INTRAVENOUS at 17:28

## 2023-10-16 RX ADMIN — ACETAMINOPHEN 650 MG: 325 TABLET ORAL at 13:30

## 2023-10-16 RX ADMIN — CEFAZOLIN SODIUM 2000 MG: 1 POWDER, FOR SOLUTION INTRAMUSCULAR; INTRAVENOUS at 08:04

## 2023-10-16 RX ADMIN — FENTANYL CITRATE 50 MCG: 50 INJECTION, SOLUTION INTRAMUSCULAR; INTRAVENOUS at 07:48

## 2023-10-16 RX ADMIN — LIDOCAINE HYDROCHLORIDE 2 MG/KG/HR: 4 INJECTION, SOLUTION INTRAVENOUS at 07:56

## 2023-10-16 RX ADMIN — HYDROMORPHONE HYDROCHLORIDE 0.5 MG: 1 INJECTION, SOLUTION INTRAMUSCULAR; INTRAVENOUS; SUBCUTANEOUS at 17:46

## 2023-10-16 RX ADMIN — MAGNESIUM SULFATE HEPTAHYDRATE 2000 MG: 40 INJECTION, SOLUTION INTRAVENOUS at 07:54

## 2023-10-16 RX ADMIN — DEXAMETHASONE SODIUM PHOSPHATE 8 MG: 4 INJECTION, SOLUTION INTRAMUSCULAR; INTRAVENOUS at 08:13

## 2023-10-16 RX ADMIN — HYDROMORPHONE HYDROCHLORIDE 0.5 MG: 1 INJECTION, SOLUTION INTRAMUSCULAR; INTRAVENOUS; SUBCUTANEOUS at 11:57

## 2023-10-16 RX ADMIN — SODIUM CHLORIDE: 9 INJECTION, SOLUTION INTRAVENOUS at 13:21

## 2023-10-16 RX ADMIN — ACETAMINOPHEN 650 MG: 325 TABLET ORAL at 06:41

## 2023-10-16 RX ADMIN — INSULIN LISPRO 4 UNITS: 100 INJECTION, SOLUTION INTRAVENOUS; SUBCUTANEOUS at 17:47

## 2023-10-16 RX ADMIN — SUGAMMADEX 200 MG: 100 INJECTION, SOLUTION INTRAVENOUS at 10:22

## 2023-10-16 RX ADMIN — SODIUM CHLORIDE, POTASSIUM CHLORIDE, SODIUM LACTATE AND CALCIUM CHLORIDE: 600; 310; 30; 20 INJECTION, SOLUTION INTRAVENOUS at 07:54

## 2023-10-16 RX ADMIN — PROPOFOL 30 MG: 10 INJECTION, EMULSION INTRAVENOUS at 10:36

## 2023-10-16 RX ADMIN — PROPOFOL 200 MG: 10 INJECTION, EMULSION INTRAVENOUS at 07:49

## 2023-10-16 RX ADMIN — KETAMINE HYDROCHLORIDE 40 MG: 10 INJECTION INTRAMUSCULAR; INTRAVENOUS at 08:24

## 2023-10-16 RX ADMIN — PREGABALIN 75 MG: 75 CAPSULE ORAL at 22:38

## 2023-10-16 RX ADMIN — KETAMINE HYDROCHLORIDE 10 MG: 10 INJECTION INTRAMUSCULAR; INTRAVENOUS at 09:18

## 2023-10-16 RX ADMIN — ROCURONIUM BROMIDE 20 MG: 10 INJECTION INTRAVENOUS at 08:43

## 2023-10-16 RX ADMIN — FENTANYL CITRATE 50 MCG: 50 INJECTION, SOLUTION INTRAMUSCULAR; INTRAVENOUS at 07:18

## 2023-10-16 RX ADMIN — ROCURONIUM BROMIDE 50 MG: 10 INJECTION INTRAVENOUS at 07:49

## 2023-10-16 ASSESSMENT — PAIN SCALES - GENERAL
PAINLEVEL_OUTOF10: 5
PAINLEVEL_OUTOF10: 7
PAINLEVEL_OUTOF10: 6
PAINLEVEL_OUTOF10: 8
PAINLEVEL_OUTOF10: 2

## 2023-10-16 ASSESSMENT — PAIN DESCRIPTION - DESCRIPTORS
DESCRIPTORS: SORE
DESCRIPTORS: ACHING

## 2023-10-16 ASSESSMENT — PAIN - FUNCTIONAL ASSESSMENT
PAIN_FUNCTIONAL_ASSESSMENT: 0-10
PAIN_FUNCTIONAL_ASSESSMENT: ACTIVITIES ARE NOT PREVENTED

## 2023-10-16 ASSESSMENT — PAIN DESCRIPTION - ORIENTATION
ORIENTATION: ANTERIOR

## 2023-10-16 ASSESSMENT — PAIN DESCRIPTION - LOCATION
LOCATION: ABDOMEN

## 2023-10-16 NOTE — ANESTHESIA POSTPROCEDURE EVALUATION
Department of Anesthesiology  Postprocedure Note    Patient: Jeronimo Sendsebastián  MRN: [id number]  YOB: 1975  Date of evaluation: 10/16/2023      Procedure Summary     Date: 10/16/23 Room / Location: [facility] MAIN OR F7 / [facility] MAIN OR    Anesthesia Start: 7445 Anesthesia Stop: 1055    Procedure: LAPAROSCOPIC RIGHT COLECTOMY (Right: Abdomen) Diagnosis:       Polyp of colon, unspecified part of colon, unspecified type      (Polyp of colon, unspecified part of colon, unspecified type [K63.5])    Surgeons: Indra Mendenhall MD Responsible Provider: Devorah Ortiz MD    Anesthesia Type: General ASA Status: 2          Anesthesia Type: General    Viky Phase I: Viky Score: 10    Viky Phase II:        Anesthesia Post Evaluation    Patient location during evaluation: PACU  Patient participation: complete - patient participated  Level of consciousness: awake  Airway patency: patent  Nausea & Vomiting: no vomiting and no nausea  Complications: no  Cardiovascular status: hemodynamically stable  Respiratory status: acceptable  Hydration status: stable  Pain management: adequate

## 2023-10-16 NOTE — ANESTHESIA PROCEDURE NOTES
Spinal Block    Patient location during procedure: pre-op  End time: 10/16/2023 7:35 AM  Reason for block: post-op pain management  Staffing  Performed: resident/CRNA   Performed by: PATEL Monroe - CRNA  Authorized by:  Nusrat Matias MD    Spinal Block  Patient position: sitting  Prep: DuraPrep  Patient monitoring: cardiac monitor, continuous pulse ox and oxygen  Approach: midline  Location: L3/L4  Provider prep: mask and sterile gloves  Needle  Needle type: pencil-tip   Needle gauge: 25 G  Assessment  Swirl obtained: Yes  CSF: clear  Attempts: 2  Preanesthetic Checklist  Completed: patient identified, IV checked, site marked, risks and benefits discussed, surgical/procedural consents, equipment checked, pre-op evaluation, timeout performed, anesthesia consent given, oxygen available, monitors applied/VS acknowledged, fire risk safety assessment completed and verbalized and blood product R/B/A discussed and consented

## 2023-10-16 NOTE — PROGRESS NOTES
Patient arrived to Room 425 1420. Wife at bedside. Episode of vomiting during travel per PACU RN. Patient deaf, bilateral hearing aides. Wife assist with communication. Patient/spouse oriented to unit/room. Bed alarm on. SCD's on. All safety measures in place.

## 2023-10-16 NOTE — PERIOP NOTE
TRANSFER - OUT REPORT:    Verbal report given to BRANDO Mao on Ulises Reynoso  being transferred to [facility] for routine post-op       Report consisted of patient's Situation, Background, Assessment and   Recommendations(SBAR). Information from the following report(s) MAR and Cardiac Rhythm sinus tach  was reviewed with the receiving nurse. Lines:   Peripheral IV 10/16/23 Right;Dorsal Hand (Active)   Site Assessment Clean, dry & intact 10/16/23 1159   Line Status Infusing 10/16/23 [facility] Connections checked and tightened 10/16/23 1159   Phlebitis Assessment No symptoms 10/16/23 1159   Infiltration Assessment 0 10/16/23 1159   Alcohol Cap Used Yes 10/16/23 1159   Dressing Status Clean, dry & intact 10/16/23 1159   Dressing Type Transparent 10/16/23 0659       Peripheral IV 10/16/23 Left;Posterior Hand (Active)   Site Assessment Clean, dry & intact 10/16/23 1203   Line Status Infusing 10/16/23 [facility] Connections checked and tightened 10/16/23 1203   Phlebitis Assessment No symptoms 10/16/23 1203   Infiltration Assessment 0 10/16/23 1203   Alcohol Cap Used Yes 10/16/23 1203   Dressing Status Clean, dry & intact 10/16/23 1203        Opportunity for questions and clarification was provided.       Patient transported with:  O2 @ 2lpm

## 2023-10-16 NOTE — BRIEF OP NOTE
Brief Postoperative Note      Patient: Aris Daniels  YOB: 1975  MRN: [id number]    Date of Procedure: 10/16/2023    Pre-Op Diagnosis Codes:     * Polyp of colon, unspecified part of colon, unspecified type [K63.5]    Post-Op Diagnosis: Post-Op Diagnosis Codes:     * Polyp of colon, unspecified part of colon, unspecified type [K63.5]       Procedure(s):  LAPAROSCOPIC RIGHT COLECTOMY    Surgeon(s):  Margarita Lugo MD    Assistant:  Surgical Assistant: Sasha Shrestha    Anesthesia: General    Estimated Blood Loss (mL): Minimal    Complications: None    Specimens:   ID Type Source Tests Collected by Time Destination   1 : ASCENDING COLON Tissue Colon-Ascending SURGICAL PATHOLOGY Margarita Lugo MD 10/16/2023 1008        Implants:  * No implants in log *      Drains:   Urinary Catheter 10/16/23 2 Way; Kaminski (Active)   $ Urethral catheter insertion Inserted for procedure 10/16/23 0815   Catheter Indications Perioperative use for selected surgical procedures 10/16/23 0815   Site Assessment Pink;Moist;Other (Comment) 10/16/23 0815   Urine Color Reena 10/16/23 0815   Urine Appearance Clear 10/16/23 0815   Collection Container Standard 10/16/23 0815   Securement Method Securing device (Describe) 10/16/23 0815   Catheter Best Practices  Drainage tube clipped to bed;Catheter secured to thigh; Tamper seal intact; Bag below bladder;Bag not on floor; Lack of dependent loop in tubing;Drainage bag less than half full 10/16/23 0815   Status Draining;Patent 10/16/23 0815       Findings: tattoo in the ascending colon corresponding to polypectomy site  This procedure was not performed to treat colon cancer through resection      Electronically signed by Sarah Horta MD on 10/16/2023 at 10:23 AM

## 2023-10-17 LAB
ANION GAP SERPL CALC-SCNC: 6 MMOL/L (ref 5–15)
BUN SERPL-MCNC: 19 MG/DL (ref 6–20)
BUN/CREAT SERPL: 24 (ref 12–20)
CALCIUM SERPL-MCNC: 8.3 MG/DL (ref 8.5–10.1)
CHLORIDE SERPL-SCNC: 108 MMOL/L (ref 97–108)
CO2 SERPL-SCNC: 26 MMOL/L (ref 21–32)
CREAT SERPL-MCNC: 0.78 MG/DL (ref 0.7–1.3)
ERYTHROCYTE [DISTWIDTH] IN BLOOD BY AUTOMATED COUNT: 17.2 % (ref 11.5–14.5)
GLUCOSE BLD STRIP.AUTO-MCNC: 244 MG/DL (ref 65–117)
GLUCOSE BLD STRIP.AUTO-MCNC: 321 MG/DL (ref 65–117)
GLUCOSE SERPL-MCNC: 258 MG/DL (ref 65–100)
HCT VFR BLD AUTO: 35.4 % (ref 36.6–50.3)
HGB BLD-MCNC: 10.6 G/DL (ref 12.1–17)
MCH RBC QN AUTO: 19 PG (ref 26–34)
MCHC RBC AUTO-ENTMCNC: 29.9 G/DL (ref 30–36.5)
MCV RBC AUTO: 63.6 FL (ref 80–99)
NRBC # BLD: 0 K/UL (ref 0–0.01)
NRBC BLD-RTO: 0 PER 100 WBC
PLATELET # BLD AUTO: 330 K/UL (ref 150–400)
PMV BLD AUTO: 10.2 FL (ref 8.9–12.9)
POTASSIUM SERPL-SCNC: 3.5 MMOL/L (ref 3.5–5.1)
RBC # BLD AUTO: 5.57 M/UL (ref 4.1–5.7)
SERVICE CMNT-IMP: ABNORMAL
SERVICE CMNT-IMP: ABNORMAL
SODIUM SERPL-SCNC: 140 MMOL/L (ref 136–145)
WBC # BLD AUTO: 17.4 K/UL (ref 4.1–11.1)

## 2023-10-17 PROCEDURE — 2580000003 HC RX 258: Performed by: COLON & RECTAL SURGERY

## 2023-10-17 PROCEDURE — 80048 BASIC METABOLIC PNL TOTAL CA: CPT

## 2023-10-17 PROCEDURE — 94761 N-INVAS EAR/PLS OXIMETRY MLT: CPT

## 2023-10-17 PROCEDURE — 6370000000 HC RX 637 (ALT 250 FOR IP): Performed by: COLON & RECTAL SURGERY

## 2023-10-17 PROCEDURE — 6360000002 HC RX W HCPCS: Performed by: COLON & RECTAL SURGERY

## 2023-10-17 PROCEDURE — 97116 GAIT TRAINING THERAPY: CPT

## 2023-10-17 PROCEDURE — 82962 GLUCOSE BLOOD TEST: CPT

## 2023-10-17 PROCEDURE — 36415 COLL VENOUS BLD VENIPUNCTURE: CPT

## 2023-10-17 PROCEDURE — 97161 PT EVAL LOW COMPLEX 20 MIN: CPT

## 2023-10-17 PROCEDURE — 6370000000 HC RX 637 (ALT 250 FOR IP): Performed by: INTERNAL MEDICINE

## 2023-10-17 PROCEDURE — 85027 COMPLETE CBC AUTOMATED: CPT

## 2023-10-17 PROCEDURE — 1100000000 HC RM PRIVATE

## 2023-10-17 RX ORDER — KETOROLAC TROMETHAMINE 15 MG/ML
15 INJECTION, SOLUTION INTRAMUSCULAR; INTRAVENOUS EVERY 8 HOURS
Status: DISCONTINUED | OUTPATIENT
Start: 2023-10-17 | End: 2023-10-19 | Stop reason: HOSPADM

## 2023-10-17 RX ORDER — ACETAMINOPHEN 500 MG
1000 TABLET ORAL EVERY 6 HOURS
Status: DISCONTINUED | OUTPATIENT
Start: 2023-10-18 | End: 2023-10-19 | Stop reason: HOSPADM

## 2023-10-17 RX ORDER — KETOROLAC TROMETHAMINE 15 MG/ML
15 INJECTION, SOLUTION INTRAMUSCULAR; INTRAVENOUS EVERY 6 HOURS PRN
Status: DISCONTINUED | OUTPATIENT
Start: 2023-10-17 | End: 2023-10-17

## 2023-10-17 RX ORDER — SODIUM CHLORIDE, SODIUM LACTATE, POTASSIUM CHLORIDE, CALCIUM CHLORIDE 600; 310; 30; 20 MG/100ML; MG/100ML; MG/100ML; MG/100ML
INJECTION, SOLUTION INTRAVENOUS CONTINUOUS
Status: DISCONTINUED | OUTPATIENT
Start: 2023-10-17 | End: 2023-10-19

## 2023-10-17 RX ORDER — HYDROMORPHONE HYDROCHLORIDE 1 MG/ML
1 INJECTION, SOLUTION INTRAMUSCULAR; INTRAVENOUS; SUBCUTANEOUS
Status: DISCONTINUED | OUTPATIENT
Start: 2023-10-17 | End: 2023-10-19 | Stop reason: HOSPADM

## 2023-10-17 RX ADMIN — KETOROLAC TROMETHAMINE 15 MG: 15 INJECTION, SOLUTION INTRAMUSCULAR; INTRAVENOUS at 20:45

## 2023-10-17 RX ADMIN — ACETAMINOPHEN 650 MG: 325 TABLET ORAL at 01:25

## 2023-10-17 RX ADMIN — SODIUM CHLORIDE, PRESERVATIVE FREE 10 ML: 5 INJECTION INTRAVENOUS at 20:49

## 2023-10-17 RX ADMIN — PREGABALIN 75 MG: 75 CAPSULE ORAL at 08:51

## 2023-10-17 RX ADMIN — OXYCODONE HYDROCHLORIDE 5 MG: 5 TABLET ORAL at 11:21

## 2023-10-17 RX ADMIN — ACETAMINOPHEN 650 MG: 325 TABLET ORAL at 12:02

## 2023-10-17 RX ADMIN — INSULIN LISPRO 4 UNITS: 100 INJECTION, SOLUTION INTRAVENOUS; SUBCUTANEOUS at 17:38

## 2023-10-17 RX ADMIN — INSULIN LISPRO 2 UNITS: 100 INJECTION, SOLUTION INTRAVENOUS; SUBCUTANEOUS at 08:52

## 2023-10-17 RX ADMIN — SODIUM CHLORIDE, POTASSIUM CHLORIDE, SODIUM LACTATE AND CALCIUM CHLORIDE: 600; 310; 30; 20 INJECTION, SOLUTION INTRAVENOUS at 20:59

## 2023-10-17 RX ADMIN — OXYCODONE HYDROCHLORIDE 10 MG: 5 TABLET ORAL at 15:47

## 2023-10-17 RX ADMIN — ACETAMINOPHEN 650 MG: 325 TABLET ORAL at 17:38

## 2023-10-17 RX ADMIN — ENOXAPARIN SODIUM 40 MG: 100 INJECTION SUBCUTANEOUS at 08:51

## 2023-10-17 RX ADMIN — FLUTICASONE PROPIONATE 1 SPRAY: 50 SPRAY, METERED NASAL at 11:22

## 2023-10-17 RX ADMIN — PREGABALIN 75 MG: 75 CAPSULE ORAL at 20:45

## 2023-10-17 RX ADMIN — INSULIN LISPRO 6 UNITS: 100 INJECTION, SOLUTION INTRAVENOUS; SUBCUTANEOUS at 12:02

## 2023-10-17 RX ADMIN — SODIUM CHLORIDE, PRESERVATIVE FREE 10 ML: 5 INJECTION INTRAVENOUS at 08:53

## 2023-10-17 RX ADMIN — HYDROMORPHONE HYDROCHLORIDE 0.5 MG: 1 INJECTION, SOLUTION INTRAMUSCULAR; INTRAVENOUS; SUBCUTANEOUS at 08:56

## 2023-10-17 RX ADMIN — OXYCODONE HYDROCHLORIDE 10 MG: 5 TABLET ORAL at 05:09

## 2023-10-17 ASSESSMENT — PAIN SCALES - GENERAL
PAINLEVEL_OUTOF10: 6
PAINLEVEL_OUTOF10: 3
PAINLEVEL_OUTOF10: 5
PAINLEVEL_OUTOF10: 8
PAINLEVEL_OUTOF10: 6
PAINLEVEL_OUTOF10: 7
PAINLEVEL_OUTOF10: 7
PAINLEVEL_OUTOF10: 6

## 2023-10-17 ASSESSMENT — PAIN DESCRIPTION - LOCATION
LOCATION: ABDOMEN
LOCATION: ABDOMEN;SHOULDER
LOCATION: ABDOMEN

## 2023-10-17 ASSESSMENT — PAIN DESCRIPTION - ORIENTATION
ORIENTATION: ANTERIOR
ORIENTATION: RIGHT
ORIENTATION: ANTERIOR
ORIENTATION: ANTERIOR

## 2023-10-17 ASSESSMENT — PAIN DESCRIPTION - DESCRIPTORS
DESCRIPTORS: SORE
DESCRIPTORS: ACHING
DESCRIPTORS: ACHING
DESCRIPTORS: SORE

## 2023-10-17 NOTE — CARE COORDINATION
10/17/23 1027   Service Assessment   Patient Orientation Alert and Oriented   Cognition Alert   History Provided By Patient   Primary Caregiver Self   Support Systems Spouse/Significant Other;Children;Family Members   Patient's Healthcare Decision Maker is: Legal Next of Kin   PCP Verified by CM Yes   Last Visit to PCP Within last 3 months   Prior Functional Level Assistance with the following:;Cooking;Housework   Current Functional Level Assistance with the following:;Cooking;Housework   Can patient return to prior living arrangement Yes   Ability to make needs known: Good   Family able to assist with home care needs: Yes   Financial Resources Medicare   Social/Functional History   Lives With Spouse; Family   Type of [facility]  Two level   Home Equipment None   ADL Assistance Independent   Ambulation Assistance Independent   Active  No   Patient's  Info wife   Discharge Planning   Type of Residence House   Living Arrangements Spouse/Significant Other   Current Services Prior To Admission None   Potential Assistance Needed N/A   History of falls? 0   Services At/After Discharge   Confirm Follow Up Transport Family     CM met with patient at bedside. Initial assessment questions were written out for patient who is deaf. Patient lives with his wife, mother and son in two story house. He reports being independent with ADLs, wife assists with cooking, shopping and transportation. Patient has no prior home health history or DME use. Prescription medications are obtained from [facility] DR ACOSTA MARIE at [facility]. Patient's family will transport at discharge.

## 2023-10-18 PROBLEM — G89.18 POSTOPERATIVE PAIN: Status: ACTIVE | Noted: 2023-10-18

## 2023-10-18 LAB
ANION GAP SERPL CALC-SCNC: 6 MMOL/L (ref 5–15)
BASOPHILS # BLD: 0 K/UL (ref 0–0.1)
BASOPHILS NFR BLD: 0 % (ref 0–1)
BUN SERPL-MCNC: 12 MG/DL (ref 6–20)
BUN/CREAT SERPL: 13 (ref 12–20)
CALCIUM SERPL-MCNC: 8 MG/DL (ref 8.5–10.1)
CHLORIDE SERPL-SCNC: 109 MMOL/L (ref 97–108)
CO2 SERPL-SCNC: 25 MMOL/L (ref 21–32)
CREAT SERPL-MCNC: 0.9 MG/DL (ref 0.7–1.3)
DIFFERENTIAL METHOD BLD: ABNORMAL
EOSINOPHIL # BLD: 0 K/UL (ref 0–0.4)
EOSINOPHIL NFR BLD: 0 % (ref 0–7)
ERYTHROCYTE [DISTWIDTH] IN BLOOD BY AUTOMATED COUNT: 17.1 % (ref 11.5–14.5)
GLUCOSE BLD STRIP.AUTO-MCNC: 196 MG/DL (ref 65–117)
GLUCOSE BLD STRIP.AUTO-MCNC: 201 MG/DL (ref 65–117)
GLUCOSE BLD STRIP.AUTO-MCNC: 224 MG/DL (ref 65–117)
GLUCOSE BLD STRIP.AUTO-MCNC: 275 MG/DL (ref 65–117)
GLUCOSE BLD STRIP.AUTO-MCNC: 312 MG/DL (ref 65–117)
GLUCOSE BLD STRIP.AUTO-MCNC: 313 MG/DL (ref 65–117)
GLUCOSE SERPL-MCNC: 329 MG/DL (ref 65–100)
HCT VFR BLD AUTO: 34 % (ref 36.6–50.3)
HGB BLD-MCNC: 10.2 G/DL (ref 12.1–17)
IMM GRANULOCYTES # BLD AUTO: 0 K/UL (ref 0–0.04)
IMM GRANULOCYTES NFR BLD AUTO: 0 % (ref 0–0.5)
LYMPHOCYTES # BLD: 2.7 K/UL (ref 0.8–3.5)
LYMPHOCYTES NFR BLD: 20 % (ref 12–49)
MCH RBC QN AUTO: 19 PG (ref 26–34)
MCHC RBC AUTO-ENTMCNC: 30 G/DL (ref 30–36.5)
MCV RBC AUTO: 63.4 FL (ref 80–99)
MONOCYTES # BLD: 1.2 K/UL (ref 0–1)
MONOCYTES NFR BLD: 9 % (ref 5–13)
NEUTS SEG # BLD: 9.6 K/UL (ref 1.8–8)
NEUTS SEG NFR BLD: 71 % (ref 32–75)
NRBC # BLD: 0 K/UL (ref 0–0.01)
NRBC BLD-RTO: 0 PER 100 WBC
PLATELET # BLD AUTO: 303 K/UL (ref 150–400)
PMV BLD AUTO: 10.5 FL (ref 8.9–12.9)
POTASSIUM SERPL-SCNC: 3.2 MMOL/L (ref 3.5–5.1)
RBC # BLD AUTO: 5.36 M/UL (ref 4.1–5.7)
RBC MORPH BLD: ABNORMAL
SERVICE CMNT-IMP: ABNORMAL
SODIUM SERPL-SCNC: 140 MMOL/L (ref 136–145)
WBC # BLD AUTO: 13.5 K/UL (ref 4.1–11.1)

## 2023-10-18 PROCEDURE — 6370000000 HC RX 637 (ALT 250 FOR IP): Performed by: INTERNAL MEDICINE

## 2023-10-18 PROCEDURE — 6360000002 HC RX W HCPCS: Performed by: COLON & RECTAL SURGERY

## 2023-10-18 PROCEDURE — 6370000000 HC RX 637 (ALT 250 FOR IP): Performed by: COLON & RECTAL SURGERY

## 2023-10-18 PROCEDURE — 80048 BASIC METABOLIC PNL TOTAL CA: CPT

## 2023-10-18 PROCEDURE — 85025 COMPLETE CBC W/AUTO DIFF WBC: CPT

## 2023-10-18 PROCEDURE — 1100000000 HC RM PRIVATE

## 2023-10-18 PROCEDURE — 2580000003 HC RX 258: Performed by: COLON & RECTAL SURGERY

## 2023-10-18 PROCEDURE — 6370000000 HC RX 637 (ALT 250 FOR IP): Performed by: NURSE PRACTITIONER

## 2023-10-18 PROCEDURE — 6370000000 HC RX 637 (ALT 250 FOR IP): Performed by: CLINICAL NURSE SPECIALIST

## 2023-10-18 PROCEDURE — 36415 COLL VENOUS BLD VENIPUNCTURE: CPT

## 2023-10-18 PROCEDURE — 94761 N-INVAS EAR/PLS OXIMETRY MLT: CPT

## 2023-10-18 RX ORDER — POTASSIUM CHLORIDE 7.45 MG/ML
10 INJECTION INTRAVENOUS PRN
Status: DISCONTINUED | OUTPATIENT
Start: 2023-10-18 | End: 2023-10-18

## 2023-10-18 RX ORDER — INSULIN GLARGINE 100 [IU]/ML
20 INJECTION, SOLUTION SUBCUTANEOUS NIGHTLY
Status: DISCONTINUED | OUTPATIENT
Start: 2023-10-18 | End: 2023-10-19 | Stop reason: HOSPADM

## 2023-10-18 RX ORDER — OXYCODONE HYDROCHLORIDE 5 MG/1
5 TABLET ORAL
Qty: 20 TABLET | Refills: 0 | Status: SHIPPED | OUTPATIENT
Start: 2023-10-18 | End: 2023-10-21

## 2023-10-18 RX ORDER — POTASSIUM CHLORIDE 750 MG/1
20 TABLET, FILM COATED, EXTENDED RELEASE ORAL ONCE
Status: COMPLETED | OUTPATIENT
Start: 2023-10-18 | End: 2023-10-18

## 2023-10-18 RX ADMIN — INSULIN LISPRO 4 UNITS: 100 INJECTION, SOLUTION INTRAVENOUS; SUBCUTANEOUS at 20:57

## 2023-10-18 RX ADMIN — ENOXAPARIN SODIUM 40 MG: 100 INJECTION SUBCUTANEOUS at 07:54

## 2023-10-18 RX ADMIN — OXYCODONE HYDROCHLORIDE 10 MG: 5 TABLET ORAL at 15:11

## 2023-10-18 RX ADMIN — INSULIN LISPRO 6 UNITS: 100 INJECTION, SOLUTION INTRAVENOUS; SUBCUTANEOUS at 12:17

## 2023-10-18 RX ADMIN — SODIUM CHLORIDE, PRESERVATIVE FREE 10 ML: 5 INJECTION INTRAVENOUS at 23:08

## 2023-10-18 RX ADMIN — ACETAMINOPHEN 1000 MG: 500 TABLET ORAL at 12:17

## 2023-10-18 RX ADMIN — SODIUM CHLORIDE, PRESERVATIVE FREE 10 ML: 5 INJECTION INTRAVENOUS at 07:55

## 2023-10-18 RX ADMIN — OXYCODONE HYDROCHLORIDE 5 MG: 5 TABLET ORAL at 18:46

## 2023-10-18 RX ADMIN — KETOROLAC TROMETHAMINE 15 MG: 15 INJECTION, SOLUTION INTRAMUSCULAR; INTRAVENOUS at 20:57

## 2023-10-18 RX ADMIN — PREGABALIN 75 MG: 75 CAPSULE ORAL at 07:54

## 2023-10-18 RX ADMIN — PREGABALIN 75 MG: 75 CAPSULE ORAL at 20:57

## 2023-10-18 RX ADMIN — FLUTICASONE PROPIONATE 1 SPRAY: 50 SPRAY, METERED NASAL at 07:55

## 2023-10-18 RX ADMIN — POTASSIUM CHLORIDE 20 MEQ: 750 TABLET, FILM COATED, EXTENDED RELEASE ORAL at 10:38

## 2023-10-18 RX ADMIN — ACETAMINOPHEN 1000 MG: 500 TABLET ORAL at 23:08

## 2023-10-18 RX ADMIN — INSULIN LISPRO 2 UNITS: 100 INJECTION, SOLUTION INTRAVENOUS; SUBCUTANEOUS at 08:06

## 2023-10-18 RX ADMIN — ACETAMINOPHEN 1000 MG: 500 TABLET ORAL at 01:13

## 2023-10-18 RX ADMIN — OXYCODONE HYDROCHLORIDE 5 MG: 5 TABLET ORAL at 23:07

## 2023-10-18 RX ADMIN — ACETAMINOPHEN 1000 MG: 500 TABLET ORAL at 17:26

## 2023-10-18 RX ADMIN — INSULIN GLARGINE 20 UNITS: 100 INJECTION, SOLUTION SUBCUTANEOUS at 14:48

## 2023-10-18 RX ADMIN — OXYCODONE HYDROCHLORIDE 10 MG: 5 TABLET ORAL at 07:54

## 2023-10-18 RX ADMIN — KETOROLAC TROMETHAMINE 15 MG: 15 INJECTION, SOLUTION INTRAMUSCULAR; INTRAVENOUS at 12:18

## 2023-10-18 RX ADMIN — OXYCODONE HYDROCHLORIDE 10 MG: 5 TABLET ORAL at 01:13

## 2023-10-18 ASSESSMENT — PAIN DESCRIPTION - ORIENTATION
ORIENTATION: ANTERIOR
ORIENTATION: MID
ORIENTATION: LOWER
ORIENTATION: ANTERIOR
ORIENTATION: ANTERIOR

## 2023-10-18 ASSESSMENT — PAIN SCALES - GENERAL
PAINLEVEL_OUTOF10: 10
PAINLEVEL_OUTOF10: 8
PAINLEVEL_OUTOF10: 5
PAINLEVEL_OUTOF10: 3
PAINLEVEL_OUTOF10: 9
PAINLEVEL_OUTOF10: 8
PAINLEVEL_OUTOF10: 3
PAINLEVEL_OUTOF10: 5

## 2023-10-18 ASSESSMENT — PAIN DESCRIPTION - LOCATION
LOCATION: BACK
LOCATION: ABDOMEN
LOCATION: HEAD
LOCATION: ABDOMEN

## 2023-10-18 ASSESSMENT — PAIN DESCRIPTION - DESCRIPTORS
DESCRIPTORS: ACHING;SORE
DESCRIPTORS: THROBBING;ACHING
DESCRIPTORS: SORE;ACHING
DESCRIPTORS: ACHING

## 2023-10-18 ASSESSMENT — PAIN - FUNCTIONAL ASSESSMENT: PAIN_FUNCTIONAL_ASSESSMENT: ACTIVITIES ARE NOT PREVENTED

## 2023-10-18 NOTE — CARE COORDINATION
10/18/2023 2:03 PM   Care Management Progress Note      ICD-10-CM    1. Postoperative pain  G89.18 oxyCODONE (ROXICODONE) 5 MG immediate release tablet          RUR: 7%   Risk Level: [x]Low []Moderate []High    Transition of care plan:  Ongoing management by [address] with family  No case management needs identified at this time, should needs arise, please consult case management   Outpatient follow-up. Pt's family to transport.

## 2023-10-18 NOTE — DIABETES MGMT
[facility] [address]  DIABETES MANAGEMENT CONSULT    Consulted by  Anand Salcido MD  for advanced nursing evaluation and care for inpatient blood glucose management. Evaluation and Action Plan   Vic Grant is a hearing impaired gentleman, with a 5 year history of Type 2 Diabetes, on jardiance, lantus and novolog who was admitted for surgical management of a polyp in the ascending colon with high grade dyplasia s/p laparoscopic right colectomy. The Program for Diabetes Health has been consulted to assist in glycemic management and advanced diabetes management assessment this admission. Mr Rita Clayton has closely worked with his PCP for diabetes care. His A1C is currently elevated at 7.8% (range 7.4%-9.2% since 11/21). At home, he reports compliance with 25mg jardiance daily, 20 units glargine at night and sliding scale novolog with dinner). He will check his glucose 2-3 times daily with ranges 150 fasting and sometimes up over 200s by dinner. He skips breakfast, consumes hardees or taco bell for lunch and enjoys chicken/rice/vegetable dish at dinner. He drinks both regular and diet soda and water. Glucose on admission was 235 and 8mg decadron was given in the operative room. Glucose this admission has remained elevated 220-321 with correctional humalog use. His oral intake is fair but consuming ensure surgery meal supplements with ERAS protocol. Basal insulin should resume and will make modifications of his diet which he is very open to. Management Rationale Action Plan   Medication   Basal needs Using 0.25 units/kg/D Start glargine at PTA dose: 20 units HS.       Give the first dose now and we will back the dose up to evening tomorrow     Nutritional needs  If eating more that 50% of meals and pre-meal glucose elevated, can start 4 units humalog with meals      Corrective insulin Using medium sensitivity ACHS Correctional humalog ACHS   Additional orders  Diet per primary team,

## 2023-10-19 VITALS
BODY MASS INDEX: 26.85 KG/M2 | DIASTOLIC BLOOD PRESSURE: 91 MMHG | HEART RATE: 94 BPM | OXYGEN SATURATION: 99 % | RESPIRATION RATE: 15 BRPM | SYSTOLIC BLOOD PRESSURE: 155 MMHG | WEIGHT: 191.8 LBS | HEIGHT: 71 IN | TEMPERATURE: 98.8 F

## 2023-10-19 LAB
GLUCOSE BLD STRIP.AUTO-MCNC: 238 MG/DL (ref 65–117)
GLUCOSE BLD STRIP.AUTO-MCNC: 305 MG/DL (ref 65–117)
SERVICE CMNT-IMP: ABNORMAL
SERVICE CMNT-IMP: ABNORMAL

## 2023-10-19 PROCEDURE — 82962 GLUCOSE BLOOD TEST: CPT

## 2023-10-19 PROCEDURE — 6360000002 HC RX W HCPCS: Performed by: COLON & RECTAL SURGERY

## 2023-10-19 PROCEDURE — 94761 N-INVAS EAR/PLS OXIMETRY MLT: CPT

## 2023-10-19 PROCEDURE — 6370000000 HC RX 637 (ALT 250 FOR IP): Performed by: COLON & RECTAL SURGERY

## 2023-10-19 PROCEDURE — 6370000000 HC RX 637 (ALT 250 FOR IP): Performed by: INTERNAL MEDICINE

## 2023-10-19 RX ADMIN — KETOROLAC TROMETHAMINE 15 MG: 15 INJECTION, SOLUTION INTRAMUSCULAR; INTRAVENOUS at 12:52

## 2023-10-19 RX ADMIN — PREGABALIN 75 MG: 75 CAPSULE ORAL at 08:43

## 2023-10-19 RX ADMIN — INSULIN LISPRO 6 UNITS: 100 INJECTION, SOLUTION INTRAVENOUS; SUBCUTANEOUS at 12:52

## 2023-10-19 RX ADMIN — INSULIN LISPRO 2 UNITS: 100 INJECTION, SOLUTION INTRAVENOUS; SUBCUTANEOUS at 08:43

## 2023-10-19 RX ADMIN — KETOROLAC TROMETHAMINE 15 MG: 15 INJECTION, SOLUTION INTRAMUSCULAR; INTRAVENOUS at 04:48

## 2023-10-19 RX ADMIN — ENOXAPARIN SODIUM 40 MG: 100 INJECTION SUBCUTANEOUS at 08:43

## 2023-10-19 RX ADMIN — OXYCODONE HYDROCHLORIDE 5 MG: 5 TABLET ORAL at 08:43

## 2023-10-19 RX ADMIN — ACETAMINOPHEN 1000 MG: 500 TABLET ORAL at 06:58

## 2023-10-19 ASSESSMENT — PAIN SCALES - GENERAL
PAINLEVEL_OUTOF10: 2
PAINLEVEL_OUTOF10: 1
PAINLEVEL_OUTOF10: 5
PAINLEVEL_OUTOF10: 1

## 2023-10-19 ASSESSMENT — PAIN DESCRIPTION - ORIENTATION: ORIENTATION: MID

## 2023-10-19 ASSESSMENT — PAIN DESCRIPTION - LOCATION: LOCATION: ABDOMEN

## 2023-10-19 ASSESSMENT — PAIN DESCRIPTION - DESCRIPTORS: DESCRIPTORS: ACHING

## 2023-10-19 NOTE — PROGRESS NOTES
Patient given discharge instructions and given the opportunity to ask questions. Patient's IV removed with cath tip intact. Patient walked down to the [address] [place] [address] [place] to pickup prescription. Wife at bedside for teaching and for walk down to the pharmacy. All patient personal belongings discharged with patient.

## 2023-10-19 NOTE — DISCHARGE SUMMARY
Colorectal Discharge Summary    Patient: Abdirizak Marte               Sex: male          DOA: 10/16/2023        YOB: 1975      Age:  46 y.o.        LOS:  LOS: 3 days                Admit Date: 10/16/2023    Discharge Date: 10/19/2023    Admission Diagnoses: Polyp of colon, unspecified part of colon, unspecified type [K63.5]  Colon polyp [K63.5]    Discharge Diagnoses:  Status post Laparoscopic right colectomy with Dr. Arianna Roy on 10/16/2023     Discharge Condition: Stable      Consults:  Diabetes Management         Discharge Medications:     Discharge Medication List as of 10/19/2023  1:04 PM        START taking these medications    Details   oxyCODONE (ROXICODONE) 5 MG immediate release tablet Take 1 tablet by mouth every 4-6 hours as needed for Pain for up to 3 days. Max Daily Amount: 30 mg, Disp-20 tablet, R-0Normal           CONTINUE these medications which have NOT CHANGED    Details   acetaminophen (TYLENOL) 325 MG tablet Take 2 tablets by mouth every 6 hours as needed for PainHistorical Med      ibuprofen (ADVIL;MOTRIN) 200 MG tablet Take 1 tablet by mouth every 6 hours as needed for PainHistorical Med      insulin lispro, 1 Unit Dial, (HUMALOG KWIKPEN) 100 UNIT/ML SOPN Inject subcutaneously 3 times a day per sliding scale and 20 units with dinner, Disp-5 Adjustable Dose Pre-filled Pen Syringe, R-5Normal      !! Glucose Blood (Wix CONTOUR NEXT TEST VI) by In Vitro routeHistorical Med      pregabalin (LYRICA) 75 MG capsule Take 1 capsule by mouth 2 times daily for 30 days.  Max Daily Amount: 150 mg, Disp-60 capsule, R-0Normal      Blood Glucose Monitoring Suppl (CONTOUR NEXT MONITOR) w/Device KIT by Does not apply routeHistorical Med      Continuous Blood Gluc Sensor (FREESTYLE HODAN 2 SENSOR) [facility] Use per package instructions, Disp-6 each, R-11Normal      empagliflozin (JARDIANCE) 25 MG tablet Take 1 tablet by mouth daily, Disp-90 tablet, R-3Normal      atorvastatin (LIPITOR) 20 MG tablet Take

## 2023-10-19 NOTE — PROGRESS NOTES
CRS  Pt with less pain. Appetite improving. Passing flatus, no stool    Flowsheet reviewed  Abd: soft, less tender    Inc: c/d/I    Plan:  Observe this morning.  If no new issues then dc this afternoon

## 2023-10-19 NOTE — CARE COORDINATION
10/19/2023 12:46 PM   Care Management Progress Note         ICD-10-CM     1. Postoperative pain  G89.18 oxyCODONE (ROXICODONE) 5 MG immediate release tablet             RUR: 7%   Risk Level: [x]Low []Moderate []High     Transition of care plan:  Discharge home today   Home with family  No case management needs identified  Outpatient follow-up. Pt's family to transport.

## 2023-10-19 NOTE — PROGRESS NOTES
Fifi Jernigan is a hearing impaired gentleman, with a 5 year history of Type 2 Diabetes, on jardiance, lantus and novolog who was admitted for surgical management of a polyp in the ascending colon with high grade dyplasia s/p laparoscopic right colectomy. The Program for Diabetes Health has been consulted to assist in glycemic management and advanced diabetes management assessment this admission. Mr Irwin Raygoza has closely worked with his PCP for diabetes care. His A1C is currently elevated at 7.8% (range 7.4%-9.2% since 11/21). At home, he reports compliance with 25mg jardiance daily, 20 units glargine at night and sliding scale novolog with dinner). He will check his glucose 2-3 times daily with ranges 150 fasting and sometimes up over 200s by dinner. He skips breakfast, consumes hardees or taco bell for lunch and enjoys chicken/rice/vegetable dish at dinner. He drinks both regular and diet soda and water. Glucose on admission was 235 and 8mg decadron was given in the operative room. Glucose this admission has remained elevated 220-321 with correctional humalog use. His oral intake is fair but consuming ensure surgery meal supplements with ERAS protocol. Basal insulin should resume and will make modifications of his diet which he is very open to. This morning the patient is tolerating PO and without complaint.      /87  Pulse 89  AF  99% RA    Recent Labs  Glucose 238 (range 196-312)  Creat 0.9    Impression  Type 2 diabetes with admission A1c 7.8%  S/P  right colectomy for unresectable polyp    Plan  Would recommend increase in Lantus to 30 units until the blood sugars come down  OK to continue other medications  Rest per team  Total time   25  minutes, more than 50% of which was in direct patient care and/or care coordination

## 2023-10-19 NOTE — PROGRESS NOTES
Physician Progress Note      Terri Torres  CSN #:                  [id number]  :                       1975  ADMIT DATE:       10/16/2023 5:51 AM  DISCH DATE:  RESPONDING  PROVIDER #:        Fabián Arshad MD          QUERY TEXT:    Patient admitted with colon polyp s/p Right colectomy, noted to have diabetes   documented in H&P. Please document in progress notes and discharge summary the   type of DM and further specificity regarding the control status of DM[de-identified]    The medical record reflects the following:  Risk Factors: DM  Clinical Indicators: admitted for R colectomy for colon polyp  - noted h/o DM with plans for DM management consult and family med consult  - POC glucose >200 throughout admission (range 220-321)  - 10/3 Hgb A1c 7.8  Treatment: SSI, POC glucose monitoring, consults for DM management and   hospitalist    Thank you,    Myrtle Moctezuma RN  CDI  Options provided:  -- DM type II with hyperglycemia  -- DM type II- unspecified  -- DM type I with hyperglycemia  -- DM type I- unspecified  -- Other - I will add my own diagnosis  -- Disagree - Not applicable / Not valid  -- Disagree - Clinically unable to determine / Unknown  -- Refer to Clinical Documentation Reviewer    PROVIDER RESPONSE TEXT:    I am only a rounding physician- Dr. Torie Valenzuela is her primary surgeon.  He   has diabetes- unknown type- with hyperglycemia    Query created by: Myrtle Moctezuma on 10/18/2023 8:25 AM      Electronically signed by:  Fabián Arshad MD 10/18/2023 10:04 PM

## 2023-10-20 ENCOUNTER — TELEPHONE (OUTPATIENT)
Facility: CLINIC | Age: 48
End: 2023-10-20

## 2023-10-20 NOTE — TELEPHONE ENCOUNTER
Pt is asking if he should start taking his medication as normal, his procedure was yesterday.  I rescheduled his missed appointment as well

## 2023-10-20 NOTE — TELEPHONE ENCOUNTER
Recommend he follow the discharge instructions from his hospital stay  Please assess if patient would benefit from a sooner THIAGO visit, he'd need an  or the green machine

## 2023-11-24 DIAGNOSIS — Z79.4 TYPE 2 DIABETES MELLITUS WITH DIABETIC NEUROPATHY, WITH LONG-TERM CURRENT USE OF INSULIN (HCC): ICD-10-CM

## 2023-11-24 DIAGNOSIS — E11.40 TYPE 2 DIABETES MELLITUS WITH DIABETIC NEUROPATHY, WITH LONG-TERM CURRENT USE OF INSULIN (HCC): ICD-10-CM

## 2023-11-24 NOTE — TELEPHONE ENCOUNTER
Pt walked in requesting refills on the below    Jardiance  Novolog injection pen  Lantus injection pen  Atorvastatin       Byron Mon at [employer]

## 2023-11-27 RX ORDER — INSULIN GLARGINE 100 [IU]/ML
20 INJECTION, SOLUTION SUBCUTANEOUS NIGHTLY
Qty: 5 ADJUSTABLE DOSE PRE-FILLED PEN SYRINGE | Refills: 5 | Status: SHIPPED | OUTPATIENT
Start: 2023-11-27

## 2023-11-27 RX ORDER — ATORVASTATIN CALCIUM 20 MG/1
20 TABLET, FILM COATED ORAL DAILY
Qty: 30 TABLET | Refills: 3 | Status: SHIPPED | OUTPATIENT
Start: 2023-11-27

## 2024-01-09 ENCOUNTER — OFFICE VISIT (OUTPATIENT)
Facility: CLINIC | Age: 49
End: 2024-01-09
Payer: MEDICARE

## 2024-01-09 VITALS
DIASTOLIC BLOOD PRESSURE: 79 MMHG | SYSTOLIC BLOOD PRESSURE: 119 MMHG | HEART RATE: 87 BPM | WEIGHT: 194 LBS | BODY MASS INDEX: 27.16 KG/M2 | HEIGHT: 71 IN | OXYGEN SATURATION: 98 %

## 2024-01-09 DIAGNOSIS — A46 ERYSIPELAS: ICD-10-CM

## 2024-01-09 DIAGNOSIS — M79.2 NEURALGIA AND NEURITIS, UNSPECIFIED: ICD-10-CM

## 2024-01-09 DIAGNOSIS — E11.65 TYPE 2 DIABETES MELLITUS WITH HYPERGLYCEMIA, WITH LONG-TERM CURRENT USE OF INSULIN (HCC): ICD-10-CM

## 2024-01-09 DIAGNOSIS — Z90.49 S/P RIGHT COLECTOMY: ICD-10-CM

## 2024-01-09 DIAGNOSIS — E11.40 TYPE 2 DIABETES MELLITUS WITH DIABETIC NEUROPATHY, WITH LONG-TERM CURRENT USE OF INSULIN (HCC): ICD-10-CM

## 2024-01-09 DIAGNOSIS — E11.65 TYPE 2 DIABETES MELLITUS WITH HYPERGLYCEMIA, WITH LONG-TERM CURRENT USE OF INSULIN (HCC): Primary | ICD-10-CM

## 2024-01-09 DIAGNOSIS — Z79.4 TYPE 2 DIABETES MELLITUS WITH DIABETIC NEUROPATHY, WITH LONG-TERM CURRENT USE OF INSULIN (HCC): ICD-10-CM

## 2024-01-09 DIAGNOSIS — Z79.4 TYPE 2 DIABETES MELLITUS WITH HYPERGLYCEMIA, WITH LONG-TERM CURRENT USE OF INSULIN (HCC): Primary | ICD-10-CM

## 2024-01-09 DIAGNOSIS — Z79.4 TYPE 2 DIABETES MELLITUS WITH HYPERGLYCEMIA, WITH LONG-TERM CURRENT USE OF INSULIN (HCC): ICD-10-CM

## 2024-01-09 LAB
ALBUMIN SERPL-MCNC: 4 G/DL (ref 3.5–5)
ALBUMIN/GLOB SERPL: 1.1 (ref 1.1–2.2)
ALP SERPL-CCNC: 85 U/L (ref 45–117)
ALT SERPL-CCNC: 30 U/L (ref 12–78)
ANION GAP SERPL CALC-SCNC: 8 MMOL/L (ref 5–15)
AST SERPL-CCNC: 14 U/L (ref 15–37)
BILIRUB SERPL-MCNC: 1.2 MG/DL (ref 0.2–1)
BUN SERPL-MCNC: 17 MG/DL (ref 6–20)
BUN/CREAT SERPL: 20 (ref 12–20)
CALCIUM SERPL-MCNC: 9.3 MG/DL (ref 8.5–10.1)
CHLORIDE SERPL-SCNC: 107 MMOL/L (ref 97–108)
CHOLEST SERPL-MCNC: 142 MG/DL
CO2 SERPL-SCNC: 26 MMOL/L (ref 21–32)
CREAT SERPL-MCNC: 0.86 MG/DL (ref 0.7–1.3)
CREAT UR-MCNC: 97.3 MG/DL
ERYTHROCYTE [DISTWIDTH] IN BLOOD BY AUTOMATED COUNT: 18.9 % (ref 11.5–14.5)
GLOBULIN SER CALC-MCNC: 3.7 G/DL (ref 2–4)
GLUCOSE SERPL-MCNC: 155 MG/DL (ref 65–100)
HCT VFR BLD AUTO: 39.3 % (ref 36.6–50.3)
HDLC SERPL-MCNC: 37 MG/DL
HDLC SERPL: 3.8 (ref 0–5)
HGB BLD-MCNC: 12.1 G/DL (ref 12.1–17)
LDLC SERPL CALC-MCNC: 60 MG/DL (ref 0–100)
MCH RBC QN AUTO: 19.8 PG (ref 26–34)
MCHC RBC AUTO-ENTMCNC: 30.8 G/DL (ref 30–36.5)
MCV RBC AUTO: 64.2 FL (ref 80–99)
MICROALBUMIN UR-MCNC: 1.17 MG/DL
MICROALBUMIN/CREAT UR-RTO: 12 MG/G (ref 0–30)
NRBC # BLD: 0 K/UL (ref 0–0.01)
NRBC BLD-RTO: 0 PER 100 WBC
PLATELET # BLD AUTO: 402 K/UL (ref 150–400)
PMV BLD AUTO: 10.6 FL (ref 8.9–12.9)
POTASSIUM SERPL-SCNC: 4.1 MMOL/L (ref 3.5–5.1)
PROT SERPL-MCNC: 7.7 G/DL (ref 6.4–8.2)
RBC # BLD AUTO: 6.12 M/UL (ref 4.1–5.7)
SODIUM SERPL-SCNC: 141 MMOL/L (ref 136–145)
TRIGL SERPL-MCNC: 225 MG/DL
VLDLC SERPL CALC-MCNC: 45 MG/DL
WBC # BLD AUTO: 8.9 K/UL (ref 4.1–11.1)

## 2024-01-09 PROCEDURE — 99215 OFFICE O/P EST HI 40 MIN: CPT | Performed by: FAMILY MEDICINE

## 2024-01-09 RX ORDER — LANCETS 30 GAUGE
EACH MISCELLANEOUS
Qty: 100 EACH | Refills: 5 | Status: SHIPPED | OUTPATIENT
Start: 2024-01-09

## 2024-01-09 RX ORDER — INSULIN LISPRO 100 [IU]/ML
INJECTION, SOLUTION INTRAVENOUS; SUBCUTANEOUS
Qty: 5 ADJUSTABLE DOSE PRE-FILLED PEN SYRINGE | Refills: 5 | Status: SHIPPED | OUTPATIENT
Start: 2024-01-09

## 2024-01-09 RX ORDER — PREGABALIN 75 MG/1
75 CAPSULE ORAL 2 TIMES DAILY
Qty: 180 CAPSULE | Refills: 1 | Status: SHIPPED | OUTPATIENT
Start: 2024-01-09 | End: 2025-01-08

## 2024-01-09 RX ORDER — GLUCOSAMINE HCL/CHONDROITIN SU 500-400 MG
CAPSULE ORAL
Qty: 100 STRIP | Refills: 3 | Status: SHIPPED | OUTPATIENT
Start: 2024-01-09

## 2024-01-09 RX ORDER — CEPHALEXIN 500 MG/1
500 CAPSULE ORAL 4 TIMES DAILY
Qty: 40 CAPSULE | Refills: 0 | Status: SHIPPED | OUTPATIENT
Start: 2024-01-09 | End: 2024-01-19

## 2024-01-09 RX ORDER — ATORVASTATIN CALCIUM 20 MG/1
20 TABLET, FILM COATED ORAL DAILY
Qty: 90 TABLET | Refills: 3 | Status: SHIPPED | OUTPATIENT
Start: 2024-01-09

## 2024-01-09 RX ORDER — INSULIN GLARGINE 100 [IU]/ML
20 INJECTION, SOLUTION SUBCUTANEOUS NIGHTLY
Qty: 5 ADJUSTABLE DOSE PRE-FILLED PEN SYRINGE | Refills: 5 | Status: SHIPPED | OUTPATIENT
Start: 2024-01-09

## 2024-01-09 NOTE — PROGRESS NOTES
Family Medicine Follow-Up Progress Note  Patient: Hon RAJAN Adkins  1975, 48 y.o., male  Encounter Date: 1/9/2024     ASSESSMENT & PLAN    ICD-10-CM    1. Type 2 diabetes mellitus with hyperglycemia, with long-term current use of insulin ([facility])  E11.65 Lipid Panel    Z79.4 Microalbumin / Creatinine Urine Ratio     Hemoglobin A1C     CBC     Comprehensive Metabolic Panel     [facility] ([address]     blood glucose monitor kit and supplies     Lancets MISC     blood glucose monitor strips      2. Type 2 diabetes mellitus with diabetic neuropathy, with long-term current use of insulin ([facility])  E11.40 empagliflozin (JARDIANCE) 25 MG tablet    Z79.4 atorvastatin (LIPITOR) 20 MG tablet     blood glucose monitor kit and supplies     Lancets MISC     blood glucose monitor strips     insulin glargine (LANTUS SOLOSTAR) 100 UNIT/ML injection pen     insulin lispro, 1 Unit Dial, (HUMALOG KWIKPEN) 100 UNIT/ML SOPN     pregabalin (LYRICA) 75 MG capsule      3. S/P right colectomy  Z90.49       4. Erysipelas  A46 cephALEXin (KEFLEX) 500 MG capsule      5. Neuralgia and neuritis, unspecified  M79.2 pregabalin (LYRICA) 75 MG capsule          Orders Placed This Encounter   Procedures    Lipid Panel     Standing Status:   Future     Standing Expiration Date:   1/9/2025    Microalbumin / Creatinine Urine Ratio     Standing Status:   Future     Standing Expiration Date:   1/9/2025    Hemoglobin A1C     Standing Status:   Future     Standing Expiration Date:   1/9/2025    CBC     Standing Status:   Future     Standing Expiration Date:   1/9/2025    Comprehensive Metabolic Panel     Standing Status:   Future     Standing Expiration Date:   1/9/2025    [facility] ([address]     Referral Priority:   Routine     Referral Type:   Eval and Treat     Referral Reason:   Specialty Services Required     Number of Visits Requested:   1       Patient Instructions   Continue current meds

## 2024-01-09 NOTE — PATIENT INSTRUCTIONS
Continue current meds as they are  Labs per my orders  Plan to estb with new PCP in my absence    Patient will leave country next week for the next 4 mo    Meds refilled today    Labs , fasting, prior to departure

## 2024-01-09 NOTE — PROGRESS NOTES
Identified pt with two pt identifiers(name and ). Reviewed record in preparation for visit and have obtained necessary documentation. All patient medications has been reviewed.  Chief Complaint   Patient presents with    Follow-up     4month    Neck Pain    Medication Refill     Meter is broken and he will need another one ordered.       Vitals:    24 1115   BP: 119/79   Pulse: 87   SpO2: 98%                   Coordination of Care Questionnaire:   1) Have you been to an emergency room, urgent care, or hospitalized since your last visit?   no    2. Have seen or consulted any other health care provider since your last visit? no

## 2024-01-10 LAB
EST. AVERAGE GLUCOSE BLD GHB EST-MCNC: 177 MG/DL
HBA1C MFR BLD: 7.8 % (ref 4–5.6)

## 2024-01-11 ENCOUNTER — TELEPHONE (OUTPATIENT)
Facility: CLINIC | Age: 49
End: 2024-01-11

## 2024-01-11 NOTE — TELEPHONE ENCOUNTER
Contour Next Monitor with Device - Prior Authorization has been submitted.         Outcome  Approved today  PA Case: [id number], Status: Approved, Coverage Starts on: 1/1/2024 12:00:00 AM, Coverage Ends on: 1/10/2025 12:00:00 AM.

## 2024-04-30 DIAGNOSIS — Z79.4 TYPE 2 DIABETES MELLITUS WITH DIABETIC NEUROPATHY, WITH LONG-TERM CURRENT USE OF INSULIN (HCC): ICD-10-CM

## 2024-04-30 DIAGNOSIS — E11.40 TYPE 2 DIABETES MELLITUS WITH DIABETIC NEUROPATHY, WITH LONG-TERM CURRENT USE OF INSULIN (HCC): ICD-10-CM

## 2024-04-30 DIAGNOSIS — E11.65 TYPE 2 DIABETES MELLITUS WITH HYPERGLYCEMIA, WITH LONG-TERM CURRENT USE OF INSULIN (HCC): ICD-10-CM

## 2024-04-30 DIAGNOSIS — Z79.4 TYPE 2 DIABETES MELLITUS WITH HYPERGLYCEMIA, WITH LONG-TERM CURRENT USE OF INSULIN (HCC): ICD-10-CM

## 2024-04-30 RX ORDER — LANCETS 30 GAUGE
EACH MISCELLANEOUS
Qty: 100 EACH | Refills: 5 | Status: SHIPPED | OUTPATIENT
Start: 2024-04-30

## 2024-04-30 NOTE — TELEPHONE ENCOUNTER
Pt has a new pt appt in June with Keke Cates     He is requesting a refill on   Novolog Pen and the lancets     Asking to send to [facility]  [place]

## 2024-05-06 RX ORDER — PEN NEEDLE, DIABETIC 29 G X1/2"
NEEDLE, DISPOSABLE MISCELLANEOUS
Qty: 50 EACH | Refills: 0 | OUTPATIENT
Start: 2024-05-06

## 2024-05-14 RX ORDER — PEN NEEDLE, DIABETIC 29 G X1/2"
NEEDLE, DISPOSABLE MISCELLANEOUS
Qty: 50 EACH | Refills: 0 | OUTPATIENT
Start: 2024-05-14

## 2024-05-14 RX ORDER — PEN NEEDLE, DIABETIC 29 G X1/2"
1 NEEDLE, DISPOSABLE MISCELLANEOUS 3 TIMES DAILY
Qty: 100 EACH | Refills: 0 | Status: SHIPPED | OUTPATIENT
Start: 2024-05-14

## 2024-05-14 NOTE — TELEPHONE ENCOUNTER
[facility] faxed new prescription request for Humalog insulin stating only Humalog is covered.  (Lantus not covered.)     Refill also requested for Relion Pen needle 31 G/8 mm mis  Quantity: 50    Former pt of Dr. Buckley has appointment to establish care with Dr. Keke Cates on 6/19/24. (Pt is hearing impaired and needs )    Geovannym

## 2024-06-14 PROBLEM — G89.18 POSTOPERATIVE PAIN: Status: RESOLVED | Noted: 2023-10-18 | Resolved: 2024-06-14

## 2024-06-14 PROBLEM — H90.3 SENSORINEURAL HEARING LOSS (SNHL) OF BOTH EARS: Status: ACTIVE | Noted: 2024-06-14

## 2024-06-14 PROBLEM — D12.6 TUBULOVILLOUS ADENOMA OF COLON: Status: RESOLVED | Noted: 2023-06-22 | Resolved: 2024-06-14

## 2024-06-14 PROBLEM — K63.5 COLON POLYP: Status: RESOLVED | Noted: 2023-10-16 | Resolved: 2024-06-14

## 2024-06-14 PROBLEM — R71.8 RBC MICROCYTOSIS: Status: ACTIVE | Noted: 2024-06-14

## 2024-06-14 PROBLEM — E78.49 OTHER HYPERLIPIDEMIA: Status: ACTIVE | Noted: 2024-06-14

## 2024-06-19 ENCOUNTER — OFFICE VISIT (OUTPATIENT)
Age: 49
End: 2024-06-19
Payer: MEDICARE

## 2024-06-19 VITALS
BODY MASS INDEX: 26.31 KG/M2 | RESPIRATION RATE: 14 BRPM | OXYGEN SATURATION: 99 % | SYSTOLIC BLOOD PRESSURE: 142 MMHG | DIASTOLIC BLOOD PRESSURE: 91 MMHG | HEIGHT: 70 IN | WEIGHT: 183.8 LBS | HEART RATE: 90 BPM

## 2024-06-19 DIAGNOSIS — E11.65 TYPE 2 DIABETES MELLITUS WITH HYPERGLYCEMIA, WITH LONG-TERM CURRENT USE OF INSULIN (HCC): Primary | ICD-10-CM

## 2024-06-19 DIAGNOSIS — M75.41 SHOULDER IMPINGEMENT SYNDROME, RIGHT: ICD-10-CM

## 2024-06-19 DIAGNOSIS — E11.65 TYPE 2 DIABETES MELLITUS WITH HYPERGLYCEMIA, WITH LONG-TERM CURRENT USE OF INSULIN (HCC): ICD-10-CM

## 2024-06-19 DIAGNOSIS — Z79.4 TYPE 2 DIABETES MELLITUS WITH HYPERGLYCEMIA, WITH LONG-TERM CURRENT USE OF INSULIN (HCC): Primary | ICD-10-CM

## 2024-06-19 DIAGNOSIS — Z79.4 TYPE 2 DIABETES MELLITUS WITH HYPERGLYCEMIA, WITH LONG-TERM CURRENT USE OF INSULIN (HCC): ICD-10-CM

## 2024-06-19 PROCEDURE — 99204 OFFICE O/P NEW MOD 45 MIN: CPT | Performed by: INTERNAL MEDICINE

## 2024-06-19 PROCEDURE — 3051F HG A1C>EQUAL 7.0%<8.0%: CPT | Performed by: INTERNAL MEDICINE

## 2024-06-19 ASSESSMENT — PATIENT HEALTH QUESTIONNAIRE - PHQ9
SUM OF ALL RESPONSES TO PHQ9 QUESTIONS 1 & 2: 0
SUM OF ALL RESPONSES TO PHQ QUESTIONS 1-9: 0
SUM OF ALL RESPONSES TO PHQ QUESTIONS 1-9: 0
2. FEELING DOWN, DEPRESSED OR HOPELESS: NOT AT ALL
1. LITTLE INTEREST OR PLEASURE IN DOING THINGS: NOT AT ALL
SUM OF ALL RESPONSES TO PHQ QUESTIONS 1-9: 0
SUM OF ALL RESPONSES TO PHQ QUESTIONS 1-9: 0

## 2024-06-20 LAB
ANION GAP SERPL CALC-SCNC: 6 MMOL/L (ref 5–15)
BUN SERPL-MCNC: 11 MG/DL (ref 6–20)
BUN/CREAT SERPL: 13 (ref 12–20)
CALCIUM SERPL-MCNC: 9.6 MG/DL (ref 8.5–10.1)
CHLORIDE SERPL-SCNC: 106 MMOL/L (ref 97–108)
CO2 SERPL-SCNC: 28 MMOL/L (ref 21–32)
CREAT SERPL-MCNC: 0.88 MG/DL (ref 0.7–1.3)
EST. AVERAGE GLUCOSE BLD GHB EST-MCNC: 157 MG/DL
GLUCOSE SERPL-MCNC: 188 MG/DL (ref 65–100)
HBA1C MFR BLD: 7.1 % (ref 4–5.6)
POTASSIUM SERPL-SCNC: 4.2 MMOL/L (ref 3.5–5.1)
SODIUM SERPL-SCNC: 140 MMOL/L (ref 136–145)

## 2024-06-25 ENCOUNTER — PHARMACY VISIT (OUTPATIENT)
Age: 49
End: 2024-06-25

## 2024-06-25 DIAGNOSIS — E11.40 TYPE 2 DIABETES MELLITUS WITH DIABETIC NEUROPATHY, WITH LONG-TERM CURRENT USE OF INSULIN (HCC): ICD-10-CM

## 2024-06-25 DIAGNOSIS — Z79.4 TYPE 2 DIABETES MELLITUS WITH HYPERGLYCEMIA, WITH LONG-TERM CURRENT USE OF INSULIN (HCC): ICD-10-CM

## 2024-06-25 DIAGNOSIS — Z79.4 TYPE 2 DIABETES MELLITUS WITH DIABETIC NEUROPATHY, WITH LONG-TERM CURRENT USE OF INSULIN (HCC): ICD-10-CM

## 2024-06-25 DIAGNOSIS — E11.65 TYPE 2 DIABETES MELLITUS WITH HYPERGLYCEMIA, WITH LONG-TERM CURRENT USE OF INSULIN (HCC): ICD-10-CM

## 2024-06-25 RX ORDER — GLUCOSAMINE HCL/CHONDROITIN SU 500-400 MG
CAPSULE ORAL
Qty: 100 STRIP | Refills: 3 | Status: SHIPPED | OUTPATIENT
Start: 2024-06-25

## 2024-06-25 NOTE — PATIENT INSTRUCTIONS
DO NOT TAKE HUMALOG    Check sugar 3 times a day  1) morning   2) before dinner  3) bedtime    Take Lantus 20 units once daily in the morning    Continue oral medications

## 2024-06-25 NOTE — PROGRESS NOTES
meals - in the upper 100's and 200's    Nutrition:  Eats about 8pm at night   Does 1 shot     Wakes up about 10/11am and doesn't check sugar.  Coffee with 2 scoops of sugar, no creamer  Lunch - sometimes 1 or 2pm - doesn't take any insulin  Dinner - around 8ish - this is when he checks his sugar        The 10-year ASCVD risk score (Juan Luis RICHMOND, et al., 2019) is: 6.7%    Values used to calculate the score:      Age: 48 years      Sex: Male      Is Non- : No      Diabetic: Yes      Tobacco smoker: No      Systolic Blood Pressure: 142 mmHg      Is BP treated: No      HDL Cholesterol: 37 MG/DL      Total Cholesterol: 142 MG/DL     Vitals:  Wt Readings from Last 3 Encounters:   06/19/24 83.4 kg (183 lb 12.8 oz)   01/09/24 88 kg (194 lb)   10/16/23 87 kg (191 lb 12.8 oz)     BP Readings from Last 3 Encounters:   06/19/24 (!) 142/91   01/09/24 119/79   10/19/23 (!) 155/91     Pulse Readings from Last 3 Encounters:   06/19/24 90   01/09/24 87   10/19/23 94       Lab Results   Component Value Date/Time    LDL 60 01/09/2024 01:39 PM     Hemoglobin A1C   Date Value Ref Range Status   06/19/2024 7.1 (H) 4.0 - 5.6 % Final     Comment:     (NOTE)  HbA1C Interpretive Ranges  <5.7              Normal  5.7 - 6.4         Consider Prediabetes  >6.5              Consider Diabetes           Estimated Creatinine Clearance: 104 mL/min (based on SCr of 0.88 mg/dL).      Medication reconciliation was completed during the visit.    There are no discontinued medications.    Patient verbalized understanding of the information presented and all of the patient’s questions were answered.  AVS was handed to the patient. Patient advised to call the office with any additional questions or concerns.    Thank you for the consult,  Elizabet Ann, PharmD, BCPS, CDCES      For Pharmacy Admin Tracking Only    Program: Medical Group  CPA in place:  Yes  Recommendation Provided To: Patient/Caregiver: 4 via In person  Intervention  136.1

## 2024-07-03 ENCOUNTER — HOSPITAL ENCOUNTER (OUTPATIENT)
Facility: HOSPITAL | Age: 49
Setting detail: RECURRING SERIES
Discharge: HOME | End: 2024-07-06
Attending: INTERNAL MEDICINE
Payer: MEDICARE

## 2024-07-03 PROCEDURE — 97162 PT EVAL MOD COMPLEX 30 MIN: CPT

## 2024-07-03 PROCEDURE — 97535 SELF CARE MNGMENT TRAINING: CPT

## 2024-07-03 PROCEDURE — 97110 THERAPEUTIC EXERCISES: CPT

## 2024-07-03 PROCEDURE — 97140 MANUAL THERAPY 1/> REGIONS: CPT

## 2024-07-03 NOTE — THERAPY EVALUATION
Physical Therapy at [place],   a part of [facility]  [address] [place] [address], [address]  [place] [address]  Phone: [phone]  Fax: [phone]       PHYSICAL THERAPY - MEDICARE EVALUATION/PLAN OF CARE NOTE (updated 3/23)      Date: 7/3/2024          Patient Name:  Hon RAJAN Adkins :  1975   Medical   Diagnosis:  Shoulder impingement syndrome, right [M75.41] Treatment Diagnosis:  M25.511  RIGHT SHOULDER PAIN M62.81  GENERAL MUSCLE WEAKNESS M54.2  NECK PAIN    Referral Source:  Keke Cates MD Provider #:  [id number]                Insurance: Payor: [facility] MEDICARE / Plan: [facility] MEDICARE / Product Type: *No Product type* /      Patient  verified yes     Visit #   Current  / Total 1 16   Time   In / Out 12:15p 1:15p   Total Treatment Time 60   Total Timed Codes 30   1:1 Treatment Time 30    [facility] Totals Reminder:  bill using total billable   min of TIMED therapeutic procedures and modalities.   8-22 min = 1 unit; 23-37 min = 2 units; 38-52 min = 3 units;  53-67 min = 4 units; 68-82 min = 5 units           SUBJECTIVE  Pain Level (0-10 scale): Today: 3 Worst: 10 Lowest: 9  []constant [x]intermittent []improving []worsening []no change since onset    Any medication changes, allergies to medications, adverse drug reactions, diagnosis change, or new procedure performed?: [x] No    [] Yes (see summary sheet for update)  Medications: Verified on Patient Summary List    Subjective functional status/changes:     Mr. Adkins is a 48 year old male c/o chronic R shoulder pain that has gotten worse over the last 6 months. Utilized  Romy #[id number]       Start of Care: 7/3/2024  Onset Date: Chronic; worse over the last 6 months  Current symptoms/Complaints: R shoulder pain, stiffness, weakness, wit intermittent R neck pain  Mechanism of Injury: Chronic  PLOF: Ind with Ad[employer], lifting, house work, sedentary computer job  Limitations to

## 2024-07-09 ENCOUNTER — PHARMACY VISIT (OUTPATIENT)
Age: 49
End: 2024-07-09

## 2024-07-09 DIAGNOSIS — Z79.4 TYPE 2 DIABETES MELLITUS WITH HYPERGLYCEMIA, WITH LONG-TERM CURRENT USE OF INSULIN (HCC): Primary | ICD-10-CM

## 2024-07-09 DIAGNOSIS — E11.65 TYPE 2 DIABETES MELLITUS WITH HYPERGLYCEMIA, WITH LONG-TERM CURRENT USE OF INSULIN (HCC): Primary | ICD-10-CM

## 2024-07-09 NOTE — PROGRESS NOTES
Pharmacy Progress Note - Diabetes Management    Assessment / Plan:   Diabetes Management:  - Per ADA guidelines, Pt's A1c is near goal of < 7%.   - Current SMBG(s)/CGM trend showing patient near goal of  fasting and <180 after meals  - encouraged patient to take Lantus 22 units once daily every day - instead of fluctuating how much he's using    Follow up: 8/6/2024       S/O: Hon RAJAN Adkins is a 48 y.o. male referred by Keke Bhandari MD for diabetes management. Patient seen with .     Current diabetes regimen include(s):    - Lantus 20 units once daily - sometimes 22 depending on sugar (if >250)  - Jardiance 25mg one daily  - Humalog - NOT using (as directed)    ROS:  Today, Pt endorses:  - Symptoms of Hyperglycemia: none  - Symptoms of Hypoglycemia: none    Blood Glucose Monitoring (BGM) or CGM:  Patient brought in blood sugars - appear to be improving  Patient using 22 when higher and 20 when lower      The 10-year ASCVD risk score (Juan Luis RICHMOND, et al., 2019) is: 6.7%    Values used to calculate the score:      Age: 48 years      Sex: Male      Is Non- : No      Diabetic: Yes      Tobacco smoker: No      Systolic Blood Pressure: 142 mmHg      Is BP treated: No      HDL Cholesterol: 37 MG/DL      Total Cholesterol: 142 MG/DL     Vitals:  Wt Readings from Last 3 Encounters:   06/19/24 83.4 kg (183 lb 12.8 oz)   01/09/24 88 kg (194 lb)   10/16/23 87 kg (191 lb 12.8 oz)     BP Readings from Last 3 Encounters:   06/19/24 (!) 142/91   01/09/24 119/79   10/19/23 (!) 155/91     Pulse Readings from Last 3 Encounters:   06/19/24 90   01/09/24 87   10/19/23 94       Lab Results   Component Value Date/Time    LDL 60 01/09/2024 01:39 PM     Hemoglobin A1C   Date Value Ref Range Status   06/19/2024 7.1 (H) 4.0 - 5.6 % Final     Comment:     (NOTE)  HbA1C Interpretive Ranges  <5.7              Normal  5.7 - 6.4         Consider Prediabetes  >6.5              Consider Diabetes

## 2024-07-24 ENCOUNTER — APPOINTMENT (OUTPATIENT)
Facility: HOSPITAL | Age: 49
End: 2024-07-24
Attending: INTERNAL MEDICINE
Payer: MEDICARE

## 2024-07-31 ENCOUNTER — APPOINTMENT (OUTPATIENT)
Facility: HOSPITAL | Age: 49
End: 2024-07-31
Attending: INTERNAL MEDICINE
Payer: MEDICARE

## 2024-08-06 ENCOUNTER — PHARMACY VISIT (OUTPATIENT)
Age: 49
End: 2024-08-06

## 2024-08-06 DIAGNOSIS — Z79.4 TYPE 2 DIABETES MELLITUS WITH HYPERGLYCEMIA, WITH LONG-TERM CURRENT USE OF INSULIN (HCC): Primary | ICD-10-CM

## 2024-08-06 DIAGNOSIS — E11.65 TYPE 2 DIABETES MELLITUS WITH HYPERGLYCEMIA, WITH LONG-TERM CURRENT USE OF INSULIN (HCC): Primary | ICD-10-CM

## 2024-08-06 RX ORDER — INSULIN GLARGINE 100 [IU]/ML
INJECTION, SOLUTION SUBCUTANEOUS
Qty: 5 ADJUSTABLE DOSE PRE-FILLED PEN SYRINGE | Refills: 3 | Status: SHIPPED | OUTPATIENT
Start: 2024-08-06

## 2024-08-06 NOTE — PROGRESS NOTES
Pharmacy Progress Note - Diabetes Management    Assessment / Plan:   Diabetes Management:  - Per ADA guidelines, Pt's A1c is near goal of < 7%.   - Current SMBG(s)/CGM trend showing majority of morning sugars near the  range and the evening doses ranging from in goal range to higher - hard to get a good understanding of when he checks these as far as time in relation to meal  - Patient has not had any low blood sugars  - will continue the regimen he's on - despite trying to get him to use a consistent amount of lantus so that we can figure out the best regimen for him, he continues to adjust it based on bedtime readings.   - follow up PRN    Follow up: PRN     S/O: Hon RAJAN Adkins is a 48 y.o. male referred by Keke Bhandari MD for diabetes management.     Current diabetes regimen include(s):    - Lantus - checks sugars before bed then adjusts his dose  <150 no shot  <200 - 20 units  200-300 takes between 22 to 24  if 300 or higher - 28 units  - Jardiance 25mg one daily     ROS:  Today, Pt endorses:  - Symptoms of Hyperglycemia: none  - Symptoms of Hypoglycemia: none    Blood Glucose Monitoring (BGM) or CGM:              The 10-year ASCVD risk score (Juan Luis RICHMOND, et al., 2019) is: 6.7%    Values used to calculate the score:      Age: 48 years      Sex: Male      Is Non- : No      Diabetic: Yes      Tobacco smoker: No      Systolic Blood Pressure: 142 mmHg      Is BP treated: No      HDL Cholesterol: 37 MG/DL      Total Cholesterol: 142 MG/DL     Vitals:  Wt Readings from Last 3 Encounters:   06/19/24 83.4 kg (183 lb 12.8 oz)   01/09/24 88 kg (194 lb)   10/16/23 87 kg (191 lb 12.8 oz)     BP Readings from Last 3 Encounters:   06/19/24 (!) 142/91   01/09/24 119/79   10/19/23 (!) 155/91     Pulse Readings from Last 3 Encounters:   06/19/24 90   01/09/24 87   10/19/23 94       Lab Results   Component Value Date/Time    LDL 60 01/09/2024 01:39 PM     Hemoglobin A1C   Date Value Ref Range

## 2024-09-18 ENCOUNTER — OFFICE VISIT (OUTPATIENT)
Age: 49
End: 2024-09-18
Payer: MEDICARE

## 2024-09-18 VITALS
SYSTOLIC BLOOD PRESSURE: 120 MMHG | HEART RATE: 86 BPM | RESPIRATION RATE: 14 BRPM | BODY MASS INDEX: 26.48 KG/M2 | DIASTOLIC BLOOD PRESSURE: 79 MMHG | HEIGHT: 70 IN | OXYGEN SATURATION: 99 % | WEIGHT: 185 LBS

## 2024-09-18 DIAGNOSIS — R82.998 FOAMY URINE: ICD-10-CM

## 2024-09-18 DIAGNOSIS — R03.0 ELEVATED BLOOD PRESSURE READING IN OFFICE WITHOUT DIAGNOSIS OF HYPERTENSION: ICD-10-CM

## 2024-09-18 DIAGNOSIS — Z79.4 TYPE 2 DIABETES MELLITUS WITH DIABETIC NEUROPATHY, WITH LONG-TERM CURRENT USE OF INSULIN (HCC): Primary | ICD-10-CM

## 2024-09-18 DIAGNOSIS — E11.40 TYPE 2 DIABETES MELLITUS WITH DIABETIC NEUROPATHY, WITH LONG-TERM CURRENT USE OF INSULIN (HCC): Primary | ICD-10-CM

## 2024-09-18 DIAGNOSIS — Z79.4 TYPE 2 DIABETES MELLITUS WITH DIABETIC NEUROPATHY, WITH LONG-TERM CURRENT USE OF INSULIN (HCC): ICD-10-CM

## 2024-09-18 DIAGNOSIS — E11.40 TYPE 2 DIABETES MELLITUS WITH DIABETIC NEUROPATHY, WITH LONG-TERM CURRENT USE OF INSULIN (HCC): ICD-10-CM

## 2024-09-18 PROCEDURE — 99214 OFFICE O/P EST MOD 30 MIN: CPT | Performed by: INTERNAL MEDICINE

## 2024-09-18 PROCEDURE — 3051F HG A1C>EQUAL 7.0%<8.0%: CPT | Performed by: INTERNAL MEDICINE

## 2024-09-18 RX ORDER — PREGABALIN 75 MG/1
75 CAPSULE ORAL 2 TIMES DAILY
Qty: 180 CAPSULE | Refills: 1 | Status: SHIPPED | OUTPATIENT
Start: 2024-09-18 | End: 2025-09-18

## 2024-09-18 ASSESSMENT — PATIENT HEALTH QUESTIONNAIRE - PHQ9
2. FEELING DOWN, DEPRESSED OR HOPELESS: NOT AT ALL
SUM OF ALL RESPONSES TO PHQ QUESTIONS 1-9: 0
1. LITTLE INTEREST OR PLEASURE IN DOING THINGS: NOT AT ALL
SUM OF ALL RESPONSES TO PHQ9 QUESTIONS 1 & 2: 0
SUM OF ALL RESPONSES TO PHQ QUESTIONS 1-9: 0

## 2024-09-19 ENCOUNTER — PATIENT MESSAGE (OUTPATIENT)
Age: 49
End: 2024-09-19

## 2024-09-19 ENCOUNTER — TELEPHONE (OUTPATIENT)
Dept: PHARMACY | Facility: CLINIC | Age: 49
End: 2024-09-19

## 2024-09-19 LAB
ANION GAP SERPL CALC-SCNC: 5 MMOL/L (ref 2–12)
APPEARANCE UR: CLEAR
BACTERIA URNS QL MICRO: NEGATIVE /HPF
BILIRUB UR QL: NEGATIVE
BUN SERPL-MCNC: 18 MG/DL (ref 6–20)
BUN/CREAT SERPL: 23 (ref 12–20)
CALCIUM SERPL-MCNC: 9.1 MG/DL (ref 8.5–10.1)
CHLORIDE SERPL-SCNC: 108 MMOL/L (ref 97–108)
CO2 SERPL-SCNC: 27 MMOL/L (ref 21–32)
COLOR UR: ABNORMAL
CREAT SERPL-MCNC: 0.8 MG/DL (ref 0.7–1.3)
EPITH CASTS URNS QL MICRO: ABNORMAL /LPF
EST. AVERAGE GLUCOSE BLD GHB EST-MCNC: 166 MG/DL
GLUCOSE SERPL-MCNC: 99 MG/DL (ref 65–100)
GLUCOSE UR STRIP.AUTO-MCNC: >1000 MG/DL
HBA1C MFR BLD: 7.4 % (ref 4–5.6)
HGB UR QL STRIP: NEGATIVE
HYALINE CASTS URNS QL MICRO: ABNORMAL /LPF (ref 0–5)
KETONES UR QL STRIP.AUTO: NEGATIVE MG/DL
LEUKOCYTE ESTERASE UR QL STRIP.AUTO: NEGATIVE
NITRITE UR QL STRIP.AUTO: NEGATIVE
PH UR STRIP: 5.5 (ref 5–8)
POTASSIUM SERPL-SCNC: 3.8 MMOL/L (ref 3.5–5.1)
PROT UR STRIP-MCNC: ABNORMAL MG/DL
RBC #/AREA URNS HPF: ABNORMAL /HPF (ref 0–5)
SODIUM SERPL-SCNC: 140 MMOL/L (ref 136–145)
SP GR UR REFRACTOMETRY: 1.03 (ref 1–1.03)
UROBILINOGEN UR QL STRIP.AUTO: 0.2 EU/DL (ref 0.2–1)
WBC URNS QL MICRO: ABNORMAL /HPF (ref 0–4)

## 2024-11-26 ENCOUNTER — PATIENT MESSAGE (OUTPATIENT)
Facility: CLINIC | Age: 49
End: 2024-11-26

## 2024-11-27 NOTE — TELEPHONE ENCOUNTER
Christiano ready for patient to . [employer] message sent to patient. Application link sent to nurse to print out for patient to fill out for 2025.    Elizabet Ann, PharmD, BCPS, CDCES    For Pharmacy Admin Tracking Only    Program: Medical Group  CPA in place:  Yes  Recommendation Provided To: Patient/Caregiver: 1 via [facility] Message  Intervention Detail: Patient Access Assistance/Sample Provided  Intervention Accepted By: Patient/Caregiver: 1  Gap Closed?: No   Time Spent (min): 10

## 2025-02-28 DIAGNOSIS — E11.40 TYPE 2 DIABETES MELLITUS WITH DIABETIC NEUROPATHY, WITH LONG-TERM CURRENT USE OF INSULIN (HCC): ICD-10-CM

## 2025-02-28 DIAGNOSIS — Z79.4 TYPE 2 DIABETES MELLITUS WITH DIABETIC NEUROPATHY, WITH LONG-TERM CURRENT USE OF INSULIN (HCC): ICD-10-CM

## 2025-02-28 NOTE — PROGRESS NOTES
PCP: Keke Cates MD    Last Appointment: Visit date not found    Future Appointments   Date Time Provider Department Center   5/19/2025  1:00 PM Keke Cates MD [facility] DEP       Requested Prescriptions     Pending Prescriptions Disp Refills    empagliflozin (JARDIANCE) 25 MG tablet 90 tablet 3     Sig: Take 1 tablet by mouth daily AFTERNOON       LAST ORDERED: 1/9/24 New Rx to provider. Patient states he is out.      Nichol \"[place]\" HAVEN De La Vega

## 2025-03-03 ENCOUNTER — TELEPHONE (OUTPATIENT)
Facility: CLINIC | Age: 50
End: 2025-03-03

## 2025-03-03 NOTE — TELEPHONE ENCOUNTER
Spoke with [facility] Pharmacists at [facility] and she reports that they just received the order today and it has been filled an is ready for . Advised patient in clinic and he states understanding. Grateful for the interaction.

## 2025-04-14 ENCOUNTER — PATIENT MESSAGE (OUTPATIENT)
Facility: CLINIC | Age: 50
End: 2025-04-14

## 2025-04-14 ENCOUNTER — TELEPHONE (OUTPATIENT)
Facility: CLINIC | Age: 50
End: 2025-04-14

## 2025-04-14 DIAGNOSIS — Z79.4 TYPE 2 DIABETES MELLITUS WITH HYPERGLYCEMIA, WITH LONG-TERM CURRENT USE OF INSULIN (HCC): ICD-10-CM

## 2025-04-14 DIAGNOSIS — E11.65 TYPE 2 DIABETES MELLITUS WITH HYPERGLYCEMIA, WITH LONG-TERM CURRENT USE OF INSULIN (HCC): ICD-10-CM

## 2025-04-14 DIAGNOSIS — E11.40 TYPE 2 DIABETES MELLITUS WITH DIABETIC NEUROPATHY, WITH LONG-TERM CURRENT USE OF INSULIN (HCC): ICD-10-CM

## 2025-04-14 DIAGNOSIS — Z79.4 TYPE 2 DIABETES MELLITUS WITH DIABETIC NEUROPATHY, WITH LONG-TERM CURRENT USE OF INSULIN (HCC): ICD-10-CM

## 2025-04-14 DIAGNOSIS — E11.65 TYPE 2 DIABETES MELLITUS WITH HYPERGLYCEMIA, WITH LONG-TERM CURRENT USE OF INSULIN (HCC): Primary | ICD-10-CM

## 2025-04-14 DIAGNOSIS — Z79.4 TYPE 2 DIABETES MELLITUS WITH HYPERGLYCEMIA, WITH LONG-TERM CURRENT USE OF INSULIN (HCC): Primary | ICD-10-CM

## 2025-04-14 RX ORDER — INSULIN GLARGINE 100 [IU]/ML
INJECTION, SOLUTION SUBCUTANEOUS
Qty: 5 ADJUSTABLE DOSE PRE-FILLED PEN SYRINGE | Refills: 3
Start: 2025-04-14 | End: 2025-05-19 | Stop reason: SDUPTHER

## 2025-04-14 RX ORDER — INSULIN GLARGINE 100 [IU]/ML
INJECTION, SOLUTION SUBCUTANEOUS
Qty: 5 ADJUSTABLE DOSE PRE-FILLED PEN SYRINGE | Refills: 3 | Status: SHIPPED | OUTPATIENT
Start: 2025-04-14 | End: 2025-04-14 | Stop reason: SDUPTHER

## 2025-04-14 NOTE — TELEPHONE ENCOUNTER
Spoke with Kelsey with [facility] regarding Mr. Brantley assistance application being denied due to missing suite number on application. I explained to Kelsey that it is indeed on the application, along side the rest of the practice address. She took it to her supervisor who agreed it is there, therefore the current application will be reprocessed. A new application is not needed. Application process should take approximately 48 hours and [facility] will fax us once the process is complete. Confirmed fax number with Kelsey.

## 2025-04-14 NOTE — TELEPHONE ENCOUNTER
Spoke with Sunny at [facility] and he reports that a fax was sent on 2/10/25 that required patients refill to be corrected and faxed back.  They did not receive it and as of 4/1/15 he will need to complete a new application. Advised patient is almost out of his medication. He reports there is no way to expedite the form. We received the forms for completion. Dr. Cates notified.

## 2025-04-15 DIAGNOSIS — Z79.4 TYPE 2 DIABETES MELLITUS WITH HYPERGLYCEMIA, WITH LONG-TERM CURRENT USE OF INSULIN (HCC): ICD-10-CM

## 2025-04-15 DIAGNOSIS — E11.65 TYPE 2 DIABETES MELLITUS WITH HYPERGLYCEMIA, WITH LONG-TERM CURRENT USE OF INSULIN (HCC): ICD-10-CM

## 2025-04-15 DIAGNOSIS — Z79.4 TYPE 2 DIABETES MELLITUS WITH DIABETIC NEUROPATHY, WITH LONG-TERM CURRENT USE OF INSULIN (HCC): ICD-10-CM

## 2025-04-15 DIAGNOSIS — E11.40 TYPE 2 DIABETES MELLITUS WITH DIABETIC NEUROPATHY, WITH LONG-TERM CURRENT USE OF INSULIN (HCC): ICD-10-CM

## 2025-04-15 RX ORDER — ATORVASTATIN CALCIUM 20 MG/1
20 TABLET, FILM COATED ORAL DAILY
Qty: 90 TABLET | Refills: 3 | Status: SHIPPED | OUTPATIENT
Start: 2025-04-15

## 2025-05-19 ENCOUNTER — OFFICE VISIT (OUTPATIENT)
Facility: CLINIC | Age: 50
End: 2025-05-19
Payer: MEDICARE

## 2025-05-19 VITALS
HEART RATE: 83 BPM | DIASTOLIC BLOOD PRESSURE: 80 MMHG | OXYGEN SATURATION: 98 % | SYSTOLIC BLOOD PRESSURE: 124 MMHG | WEIGHT: 183.2 LBS | BODY MASS INDEX: 26.23 KG/M2 | HEIGHT: 70 IN

## 2025-05-19 DIAGNOSIS — Z79.4 TYPE 2 DIABETES MELLITUS WITH DIABETIC NEUROPATHY, WITH LONG-TERM CURRENT USE OF INSULIN (HCC): ICD-10-CM

## 2025-05-19 DIAGNOSIS — Z79.4 TYPE 2 DIABETES MELLITUS WITH DIABETIC NEUROPATHY, WITH LONG-TERM CURRENT USE OF INSULIN (HCC): Primary | ICD-10-CM

## 2025-05-19 DIAGNOSIS — E11.40 TYPE 2 DIABETES MELLITUS WITH DIABETIC NEUROPATHY, WITH LONG-TERM CURRENT USE OF INSULIN (HCC): Primary | ICD-10-CM

## 2025-05-19 DIAGNOSIS — E78.49 OTHER HYPERLIPIDEMIA: ICD-10-CM

## 2025-05-19 DIAGNOSIS — E11.40 TYPE 2 DIABETES MELLITUS WITH DIABETIC NEUROPATHY, WITH LONG-TERM CURRENT USE OF INSULIN (HCC): ICD-10-CM

## 2025-05-19 DIAGNOSIS — Z12.11 COLON CANCER SCREENING: ICD-10-CM

## 2025-05-19 PROCEDURE — 99214 OFFICE O/P EST MOD 30 MIN: CPT | Performed by: INTERNAL MEDICINE

## 2025-05-19 RX ORDER — INSULIN GLARGINE 100 [IU]/ML
INJECTION, SOLUTION SUBCUTANEOUS
Qty: 5 ADJUSTABLE DOSE PRE-FILLED PEN SYRINGE | Refills: 3 | Status: SHIPPED | OUTPATIENT
Start: 2025-05-19

## 2025-05-19 RX ORDER — PREGABALIN 150 MG/1
150 CAPSULE ORAL 2 TIMES DAILY
Qty: 180 CAPSULE | Refills: 1 | Status: SHIPPED | OUTPATIENT
Start: 2025-05-19 | End: 2026-05-19

## 2025-05-19 RX ORDER — ATORVASTATIN CALCIUM 20 MG/1
20 TABLET, FILM COATED ORAL DAILY
Qty: 90 TABLET | Refills: 3 | Status: SHIPPED | OUTPATIENT
Start: 2025-05-19

## 2025-05-19 SDOH — ECONOMIC STABILITY: FOOD INSECURITY: WITHIN THE PAST 12 MONTHS, YOU WORRIED THAT YOUR FOOD WOULD RUN OUT BEFORE YOU GOT MONEY TO BUY MORE.: NEVER TRUE

## 2025-05-19 SDOH — ECONOMIC STABILITY: FOOD INSECURITY: WITHIN THE PAST 12 MONTHS, THE FOOD YOU BOUGHT JUST DIDN'T LAST AND YOU DIDN'T HAVE MONEY TO GET MORE.: NEVER TRUE

## 2025-05-19 ASSESSMENT — PATIENT HEALTH QUESTIONNAIRE - PHQ9
2. FEELING DOWN, DEPRESSED OR HOPELESS: NOT AT ALL
SUM OF ALL RESPONSES TO PHQ QUESTIONS 1-9: 0
1. LITTLE INTEREST OR PLEASURE IN DOING THINGS: NOT AT ALL
SUM OF ALL RESPONSES TO PHQ QUESTIONS 1-9: 0

## 2025-05-19 NOTE — PROGRESS NOTES
A/P:        ICD-10-CM    1. Type 2 diabetes mellitus with diabetic neuropathy, with long-term current use of insulin (HCC)  E11.40 CBC with Auto Differential    Z79.4 Lipid Panel     Comprehensive Metabolic Panel     Albumin/Creatinine Ratio, Urine     Hemoglobin A1C     atorvastatin (LIPITOR) 20 MG tablet     empagliflozin (JARDIANCE) 25 MG tablet     pregabalin (LYRICA) 150 MG capsule      2. Other hyperlipidemia  E78.49 Lipid Panel     Comprehensive Metabolic Panel             Assessment & Plan  1. Diabetes Mellitus.  - He has not been taking his insulin or Jardiance for the past 4 weeks--pharmacy did not fill.  - Blood sugar levels have been high, reaching up to 350 mg/dL at night, which can affect his nerves and vision.  - Advised to avoid sugary foods and drinks, especially at night, and consider alternatives like diet soda, sparkling water, or fruit. Advised to monitor blood sugar levels regularly.  - Instructed to resume insulin (Lantus) and Jardiance regimen. Prescriptions for these medications will be sent to the pharmacy again. Blood and urine tests will be conducted today.    2. Neuropathy.  -Continued, symptomatic.  - Currently taking Lyrica, but sometimes skips doses to save medication.  - Informed that controlling blood sugars is crucial for managing neuropathy. Increasing the dose of Lyrica may cause drowsiness during the day.  - Dosage of Lyrica will be increased to 150 mg twice daily, once in the morning and once in the evening.    3. Hypercholesterolemia.  - Has not been taking atorvastatin for the past 4 weeks.  - Prescription for atorvastatin will be sent to the pharmacy again.  - Advised to resume taking this medication as prescribed.           Follow up:  3 months for DM          An electronic signature was used to authenticate this note.    --Keke Cates MD         HPI: Hon RAJAN Adkins is here for a follow up visit:   # [id number] Rupal  and  # [id number] Dany for ASL used     History

## 2025-05-20 ENCOUNTER — RESULTS FOLLOW-UP (OUTPATIENT)
Facility: CLINIC | Age: 50
End: 2025-05-20

## 2025-05-20 LAB
ALBUMIN SERPL-MCNC: 4 G/DL (ref 3.5–5)
ALBUMIN/GLOB SERPL: 1 (ref 1.1–2.2)
ALP SERPL-CCNC: 79 U/L (ref 45–117)
ALT SERPL-CCNC: 26 U/L (ref 12–78)
ANION GAP SERPL CALC-SCNC: 5 MMOL/L (ref 2–12)
AST SERPL-CCNC: 19 U/L (ref 15–37)
BASOPHILS # BLD: 0.09 K/UL (ref 0–0.1)
BASOPHILS NFR BLD: 1.1 % (ref 0–1)
BILIRUB SERPL-MCNC: 1.4 MG/DL (ref 0.2–1)
BUN SERPL-MCNC: 16 MG/DL (ref 6–20)
BUN/CREAT SERPL: 16 (ref 12–20)
CALCIUM SERPL-MCNC: 9.3 MG/DL (ref 8.5–10.1)
CHLORIDE SERPL-SCNC: 107 MMOL/L (ref 97–108)
CHOLEST SERPL-MCNC: 122 MG/DL
CO2 SERPL-SCNC: 27 MMOL/L (ref 21–32)
CREAT SERPL-MCNC: 0.97 MG/DL (ref 0.7–1.3)
CREAT UR-MCNC: 123 MG/DL
DIFFERENTIAL METHOD BLD: ABNORMAL
EOSINOPHIL # BLD: 0.06 K/UL (ref 0–0.4)
EOSINOPHIL NFR BLD: 0.7 % (ref 0–7)
ERYTHROCYTE [DISTWIDTH] IN BLOOD BY AUTOMATED COUNT: 18.6 % (ref 11.5–14.5)
EST. AVERAGE GLUCOSE BLD GHB EST-MCNC: 166 MG/DL
GLOBULIN SER CALC-MCNC: 4 G/DL (ref 2–4)
GLUCOSE SERPL-MCNC: 132 MG/DL (ref 65–100)
HBA1C MFR BLD: 7.4 % (ref 4–5.6)
HCT VFR BLD AUTO: 40.9 % (ref 36.6–50.3)
HDLC SERPL-MCNC: 40 MG/DL
HDLC SERPL: 3.1 (ref 0–5)
HGB BLD-MCNC: 12.3 G/DL (ref 12.1–17)
IMM GRANULOCYTES # BLD AUTO: 0.02 K/UL (ref 0–0.04)
IMM GRANULOCYTES NFR BLD AUTO: 0.2 % (ref 0–0.5)
LDLC SERPL CALC-MCNC: 61 MG/DL (ref 0–100)
LYMPHOCYTES # BLD: 3.24 K/UL (ref 0.8–3.5)
LYMPHOCYTES NFR BLD: 39.5 % (ref 12–49)
MCH RBC QN AUTO: 19.6 PG (ref 26–34)
MCHC RBC AUTO-ENTMCNC: 30.1 G/DL (ref 30–36.5)
MCV RBC AUTO: 65.3 FL (ref 80–99)
MICROALBUMIN UR-MCNC: 1.21 MG/DL
MICROALBUMIN/CREAT UR-RTO: 10 MG/G (ref 0–30)
MONOCYTES # BLD: 0.54 K/UL (ref 0–1)
MONOCYTES NFR BLD: 6.6 % (ref 5–13)
NEUTS SEG # BLD: 4.26 K/UL (ref 1.8–8)
NEUTS SEG NFR BLD: 51.9 % (ref 32–75)
NRBC # BLD: 0 K/UL (ref 0–0.01)
NRBC BLD-RTO: 0 PER 100 WBC
PLATELET # BLD AUTO: 373 K/UL (ref 150–400)
PMV BLD AUTO: 11 FL (ref 8.9–12.9)
POTASSIUM SERPL-SCNC: 3.9 MMOL/L (ref 3.5–5.1)
PROT SERPL-MCNC: 8 G/DL (ref 6.4–8.2)
RBC # BLD AUTO: 6.26 M/UL (ref 4.1–5.7)
RBC MORPH BLD: ABNORMAL
SODIUM SERPL-SCNC: 139 MMOL/L (ref 136–145)
TRIGL SERPL-MCNC: 105 MG/DL
VLDLC SERPL CALC-MCNC: 21 MG/DL
WBC # BLD AUTO: 8.2 K/UL (ref 4.1–11.1)

## 2025-06-27 PROBLEM — E11.42 DIABETIC POLYNEUROPATHY ASSOCIATED WITH TYPE 2 DIABETES MELLITUS (HCC): Status: ACTIVE | Noted: 2025-06-27

## 2025-07-02 ENCOUNTER — OFFICE VISIT (OUTPATIENT)
Facility: CLINIC | Age: 50
End: 2025-07-02
Payer: MEDICARE

## 2025-07-02 ENCOUNTER — TELEPHONE (OUTPATIENT)
Facility: CLINIC | Age: 50
End: 2025-07-02

## 2025-07-02 VITALS
DIASTOLIC BLOOD PRESSURE: 85 MMHG | SYSTOLIC BLOOD PRESSURE: 127 MMHG | BODY MASS INDEX: 26.23 KG/M2 | HEIGHT: 70 IN | WEIGHT: 183.2 LBS | HEART RATE: 80 BPM | OXYGEN SATURATION: 99 %

## 2025-07-02 DIAGNOSIS — Z79.4 TYPE 2 DIABETES MELLITUS WITH DIABETIC NEUROPATHY, WITH LONG-TERM CURRENT USE OF INSULIN (HCC): Primary | ICD-10-CM

## 2025-07-02 DIAGNOSIS — E11.42 DIABETIC POLYNEUROPATHY ASSOCIATED WITH TYPE 2 DIABETES MELLITUS (HCC): ICD-10-CM

## 2025-07-02 DIAGNOSIS — E11.40 TYPE 2 DIABETES MELLITUS WITH DIABETIC NEUROPATHY, WITH LONG-TERM CURRENT USE OF INSULIN (HCC): ICD-10-CM

## 2025-07-02 DIAGNOSIS — E11.65 TYPE 2 DIABETES MELLITUS WITH HYPERGLYCEMIA, WITH LONG-TERM CURRENT USE OF INSULIN (HCC): ICD-10-CM

## 2025-07-02 DIAGNOSIS — H90.3 SENSORINEURAL HEARING LOSS (SNHL) OF BOTH EARS: ICD-10-CM

## 2025-07-02 DIAGNOSIS — E11.40 TYPE 2 DIABETES MELLITUS WITH DIABETIC NEUROPATHY, WITH LONG-TERM CURRENT USE OF INSULIN (HCC): Primary | ICD-10-CM

## 2025-07-02 DIAGNOSIS — M54.50 ACUTE MIDLINE LOW BACK PAIN WITHOUT SCIATICA: ICD-10-CM

## 2025-07-02 DIAGNOSIS — Z79.4 TYPE 2 DIABETES MELLITUS WITH DIABETIC NEUROPATHY, WITH LONG-TERM CURRENT USE OF INSULIN (HCC): ICD-10-CM

## 2025-07-02 DIAGNOSIS — Z79.4 TYPE 2 DIABETES MELLITUS WITH HYPERGLYCEMIA, WITH LONG-TERM CURRENT USE OF INSULIN (HCC): ICD-10-CM

## 2025-07-02 PROCEDURE — 3051F HG A1C>EQUAL 7.0%<8.0%: CPT | Performed by: INTERNAL MEDICINE

## 2025-07-02 PROCEDURE — 99215 OFFICE O/P EST HI 40 MIN: CPT | Performed by: INTERNAL MEDICINE

## 2025-07-02 RX ORDER — INSULIN DEGLUDEC 200 U/ML
28 INJECTION, SOLUTION SUBCUTANEOUS DAILY
Qty: 9 ML | Refills: 0
Start: 2025-07-02

## 2025-07-02 RX ORDER — DICLOFENAC SODIUM 75 MG/1
75 TABLET, DELAYED RELEASE ORAL 2 TIMES DAILY
Qty: 30 TABLET | Refills: 0 | Status: SHIPPED | OUTPATIENT
Start: 2025-07-02

## 2025-07-02 RX ORDER — PEN NEEDLE, DIABETIC 29 G X1/2"
1 NEEDLE, DISPOSABLE MISCELLANEOUS 3 TIMES DAILY
Qty: 100 EACH | Refills: 3 | Status: SHIPPED | OUTPATIENT
Start: 2025-07-02

## 2025-07-02 NOTE — TELEPHONE ENCOUNTER
Please call his wife to have her  Tresiba 200 units/mL samples.  Because he cannot  his Lantus insulin until later this month, he should use this instead.  Once he can get his Lantus and Jardiance, he should start the Lantus and Jardiance again and stop the Tresiba.    Also sending this message to Elizabet so she knows we took a box of samples.

## 2025-07-14 ENCOUNTER — HOSPITAL ENCOUNTER (EMERGENCY)
Facility: HOSPITAL | Age: 50
Discharge: HOME | End: 2025-07-14
Attending: EMERGENCY MEDICINE
Payer: MEDICARE

## 2025-07-14 ENCOUNTER — APPOINTMENT (OUTPATIENT)
Facility: HOSPITAL | Age: 50
End: 2025-07-14
Payer: MEDICARE

## 2025-07-14 VITALS
HEART RATE: 82 BPM | TEMPERATURE: 97.5 F | RESPIRATION RATE: 18 BRPM | SYSTOLIC BLOOD PRESSURE: 135 MMHG | BODY MASS INDEX: 27.02 KG/M2 | WEIGHT: 188.71 LBS | HEIGHT: 70 IN | OXYGEN SATURATION: 97 % | DIASTOLIC BLOOD PRESSURE: 85 MMHG

## 2025-07-14 DIAGNOSIS — M79.2 NEUROPATHIC PAIN: Primary | ICD-10-CM

## 2025-07-14 LAB
GLUCOSE BLD STRIP.AUTO-MCNC: 119 MG/DL (ref 65–117)
SERVICE CMNT-IMP: ABNORMAL

## 2025-07-14 PROCEDURE — 99283 EMERGENCY DEPT VISIT LOW MDM: CPT

## 2025-07-14 PROCEDURE — 73630 X-RAY EXAM OF FOOT: CPT

## 2025-07-14 PROCEDURE — 6370000000 HC RX 637 (ALT 250 FOR IP): Performed by: EMERGENCY MEDICINE

## 2025-07-14 PROCEDURE — 82962 GLUCOSE BLOOD TEST: CPT

## 2025-07-14 RX ORDER — PREGABALIN 150 MG/1
150 CAPSULE ORAL 2 TIMES DAILY PRN
Qty: 10 CAPSULE | Refills: 0 | Status: SHIPPED | OUTPATIENT
Start: 2025-07-14 | End: 2025-07-19

## 2025-07-14 RX ORDER — PREGABALIN 75 MG/1
150 CAPSULE ORAL ONCE
Status: COMPLETED | OUTPATIENT
Start: 2025-07-14 | End: 2025-07-14

## 2025-07-14 RX ADMIN — PREGABALIN 150 MG: 75 CAPSULE ORAL at 07:31

## 2025-07-14 ASSESSMENT — PAIN DESCRIPTION - LOCATION: LOCATION: FOOT

## 2025-07-14 ASSESSMENT — PAIN DESCRIPTION - DESCRIPTORS: DESCRIPTORS: SORE;SHOOTING

## 2025-07-14 ASSESSMENT — PAIN DESCRIPTION - ORIENTATION: ORIENTATION: RIGHT

## 2025-07-14 ASSESSMENT — PAIN SCALES - GENERAL: PAINLEVEL_OUTOF10: 10

## 2025-07-14 NOTE — CONSULTS
Session ID: [id number]  Session Duration: Longer than 54 minutes  Language: ASL   ID: #[id number]   Name: Yun

## 2025-07-14 NOTE — ED NOTES
Patient updated on needing to follow up with PCP regarding new medication. Patient stated he felt dizzy and requested crackers/OJ and for his BS to be checked. .

## 2025-07-14 NOTE — ED TRIAGE NOTES
Patient ambulatory to Triage with c/o right foot pain that started last night while he was sleeping    Reports that he feels like it is a shock going through his foot    Denies any recent injury to his foot    Patient deaf at baseline -  used in Triage     Hx of Diabetic Neuropathy

## 2025-07-14 NOTE — ED NOTES
Patient requesting a different medication to be sent to pharmacy. MD in emergency. RN will talk to MD when out of room.

## 2025-07-14 NOTE — ED PROVIDER NOTES
[facility] EMERGENCY DEPARTMENT  EMERGENCY DEPARTMENT ENCOUNTER      Patient Name: Hon RAJAN Adkins  MRN: [id number]  Birthdate 1975  Date of Evaluation: 7/14/2025  Physician: Lincoln Mena MD    CHIEF COMPLAINT       Chief Complaint   Patient presents with    Foot Pain       HISTORY OF PRESENT ILLNESS   (Location/Symptom, Timing/Onset, Context/Setting, Quality, Duration, Modifying Factors, Severity)   Hon RAJAN Adkins, 49 y.o., male     49-year-old male with a history of diabetes presents with right foot pain.  Symptoms started last night.  He denies trauma.  He has experienced similar pain in the past and was given pregabalin for this.          Nursing Notes were reviewed.    REVIEW OF SYSTEMS    (Not required)   Review of Systems    Except as noted above the remainder of the review of systems was reviewed and negative.     PAST MEDICAL HISTORY     Past Medical History:   Diagnosis Date    Diabetes (HCC)     Tubulovillous adenoma of colon 06/22/2023       SURGICAL HISTORY       Past Surgical History:   Procedure Laterality Date    COLECTOMY Right 10/16/2023    LAPAROSCOPIC RIGHT COLECTOMY performed by Carlos Manuel Prakash MD at [facility] MAIN OR    COLONOSCOPY N/A 05/17/2023    COLONOSCOPY performed by Ham Land MD at [facility] ENDOSCOPY    COLONOSCOPY N/A 05/17/2023    COLONOSCOPY POLYPECTOMY SNARE/COLD BIOPSY performed by Ham Land MD at [facility] ENDOSCOPY    COLONOSCOPY  05/17/2023    COLONOSCOPY CONTROL HEMORRHAGE/STOMA Clip x 3  performed by Ham Land MD at [facility] ENDOSCOPY    COLONOSCOPY N/A 05/17/2023    COLONOSCOPY FOREIGN BODY REMOVAL performed by Ham Land MD at [facility] ENDOSCOPY    MEDICATION INJECTION N/A 05/17/2023    INJECTION MEDICATION (Ink) performed by Ham Land MD at [facility] ENDOSCOPY       CURRENT MEDICATIONS       Discharge Medication List as of 7/14/2025  8:22 AM        CONTINUE these medications which have NOT CHANGED    Details   diclofenac (VOLTAREN) 75 MG EC tablet Take 1 tablet by mouth 2 times

## 2025-07-30 DIAGNOSIS — M54.50 ACUTE MIDLINE LOW BACK PAIN WITHOUT SCIATICA: ICD-10-CM

## 2025-07-31 RX ORDER — DICLOFENAC SODIUM 75 MG/1
75 TABLET, DELAYED RELEASE ORAL 2 TIMES DAILY WITH MEALS
Qty: 30 TABLET | Refills: 0 | Status: SHIPPED | OUTPATIENT
Start: 2025-07-31

## 2025-09-03 ENCOUNTER — OFFICE VISIT (OUTPATIENT)
Facility: CLINIC | Age: 50
End: 2025-09-03
Payer: MEDICARE

## 2025-09-03 VITALS
SYSTOLIC BLOOD PRESSURE: 132 MMHG | HEART RATE: 88 BPM | OXYGEN SATURATION: 98 % | HEIGHT: 70 IN | WEIGHT: 185 LBS | BODY MASS INDEX: 26.48 KG/M2 | DIASTOLIC BLOOD PRESSURE: 74 MMHG

## 2025-09-03 DIAGNOSIS — E11.42 DIABETIC POLYNEUROPATHY ASSOCIATED WITH TYPE 2 DIABETES MELLITUS (HCC): ICD-10-CM

## 2025-09-03 DIAGNOSIS — E11.65 TYPE 2 DIABETES MELLITUS WITH HYPERGLYCEMIA, WITH LONG-TERM CURRENT USE OF INSULIN (HCC): Primary | ICD-10-CM

## 2025-09-03 DIAGNOSIS — Z79.4 TYPE 2 DIABETES MELLITUS WITH HYPERGLYCEMIA, WITH LONG-TERM CURRENT USE OF INSULIN (HCC): Primary | ICD-10-CM

## 2025-09-03 PROCEDURE — 99214 OFFICE O/P EST MOD 30 MIN: CPT | Performed by: INTERNAL MEDICINE

## 2025-09-03 PROCEDURE — 3051F HG A1C>EQUAL 7.0%<8.0%: CPT | Performed by: INTERNAL MEDICINE

## 2025-09-03 RX ORDER — INSULIN GLARGINE 100 [IU]/ML
INJECTION, SOLUTION SUBCUTANEOUS
Qty: 5 ADJUSTABLE DOSE PRE-FILLED PEN SYRINGE | Refills: 3 | Status: SHIPPED | OUTPATIENT
Start: 2025-09-03

## 2025-09-03 RX ORDER — PREGABALIN 150 MG/1
150 CAPSULE ORAL 2 TIMES DAILY
Qty: 180 CAPSULE | Refills: 1 | Status: SHIPPED | OUTPATIENT
Start: 2025-09-03 | End: 2026-09-03

## (undated) DEVICE — COLON CLOSING PACK: Brand: MEDLINE INDUSTRIES, INC.

## (undated) DEVICE — SET GRAV CK VLV NEEDLESS ST 3 GANGED 4WAY STPCOCK HI FLO 10

## (undated) DEVICE — SUTURE PROL SZ 0 L30IN NONABSORBABLE BLU L36MM CT-1 1/2 CIR 8424H

## (undated) DEVICE — YANKAUER,BULB TIP,W/O VENT,RIGID,STERILE: Brand: MEDLINE

## (undated) DEVICE — PAD BD MATTRESS 73X32 IN STD CONVOLUTED FOAM LTX FREE

## (undated) DEVICE — ELECTRODE PT RET AD L9FT HI MOIST COND ADH HYDRGEL CORDED

## (undated) DEVICE — Device

## (undated) DEVICE — LIQUIBAND RAPID ADHESIVE 36/CS 0.8ML: Brand: MEDLINE

## (undated) DEVICE — DRAPE FLD WRM W44XL66IN C6L FOR INTRATEMP SYS THERMABASIN

## (undated) DEVICE — STAPLER INT L60MM REG TISS BLU B FRM 8 FIRING 2 ROW AUTO

## (undated) DEVICE — SNARE ENDOSCP SM L240CM W13MM SHTH DIA2.4MM CHN 2.8MM OVL

## (undated) DEVICE — STAPLER INT L75MM CUT LN L73MM STPL LN L77MM BLU B FRM 8

## (undated) DEVICE — STAPLER INT L34CM 60MM LNG ENDOSCP ARTC PWR + ECHELON FLX

## (undated) DEVICE — SOLUTION IRRIG 1000ML STRL H2O USP PLAS POUR BTL

## (undated) DEVICE — SEALER TISS L37CM SHFT DIA5MM 360DEG ROT CVD JAW TAPR TIP

## (undated) DEVICE — SUTURE VCRL SZ 3-0 L27IN ABSRB UD L26MM SH 1/2 CIR J416H

## (undated) DEVICE — TROCAR ENDOSCP L100MM DIA5MM BLDELSS STBL SL OBT RADLUC

## (undated) DEVICE — GLOVE ORANGE PI 7 1/2   MSG9075

## (undated) DEVICE — GENERAL LAPAROSCOPY-SFMC: Brand: MEDLINE INDUSTRIES, INC.

## (undated) DEVICE — 3M™ IOBAN™ 2 ANTIMICROBIAL INCISE DRAPE 6650EZ: Brand: IOBAN™ 2

## (undated) DEVICE — RELOAD STPL L60MM H1-2.6MM MESENTERY THN TISS WHT 6 ROW

## (undated) DEVICE — RELOAD STPL L75MM OPN H3.8MM CLS 1.5MM WIRE DIA0.2MM REG

## (undated) DEVICE — TOWEL,OR,DSP,ST,BLUE,STD,4/PK,20PK/CS: Brand: MEDLINE

## (undated) DEVICE — TRANSFER SET 3": Brand: MEDLINE INDUSTRIES, INC.

## (undated) DEVICE — CANISTER, RIGID, 3000CC: Brand: MEDLINE INDUSTRIES, INC.

## (undated) DEVICE — TROCAR LAP L100MM DIA5MM BLDELSS W/ STBL SL ENDOPATH XCEL

## (undated) DEVICE — SUTURE PERMAHAND SZ 3-0 L18IN NONABSORBABLE BLK L26MM SH C013D

## (undated) DEVICE — SOLUTION IRRIG 1000ML 0.9% SOD CHL USP POUR PLAS BTL

## (undated) DEVICE — YANKAUER,POOLE TIP,STERILE,50/CS: Brand: MEDLINE

## (undated) DEVICE — SOLUTION IV 1000ML 0.9% SOD CHL PH 5 INJ USP VIAFLX PLAS

## (undated) DEVICE — SUTURE MCRYL + SZ 4-0 L27IN ABSRB UD L19MM PS-2 3/8 CIR MCP426H

## (undated) DEVICE — SOLUTION ANTIFOG VIS SYS CLEARIFY LAPSCP

## (undated) DEVICE — SYRINGE IRRIG 60ML SFT PLIABLE BLB EZ TO GRP 1 HND USE W/

## (undated) DEVICE — STERILE-Z MAYO STAND COVERS CLEAR POLYETHYLENE STERILE UNIVERSAL FIT 20 PER CASE: Brand: STERILE-Z

## (undated) DEVICE — BANDAGE COBAN 4 IN COMPR W4INXL5YD FOAM COHESIVE QUIK STK SELF ADH SFT

## (undated) DEVICE — TUBING, SUCTION, 1/4" X 10', STRAIGHT: Brand: MEDLINE

## (undated) DEVICE — TROCAR ENDOSCP L100MM DIA12MM BLDELSS OBT RADLUC STBL SL

## (undated) DEVICE — TAPE,CLOTH/SILK,CURAD,3"X10YD,LF,40/CS: Brand: CURAD

## (undated) DEVICE — ACCESS PLATFORM FOR MINIMALLY INVASIVE SURGERY.: Brand: GELPORT® LAPAROSCOPIC  SYSTEM

## (undated) DEVICE — 1LYRTR 16FR10ML100%SIL UMS SNP: Brand: MEDLINE INDUSTRIES, INC.